# Patient Record
Sex: MALE | Race: WHITE | NOT HISPANIC OR LATINO | URBAN - METROPOLITAN AREA
[De-identification: names, ages, dates, MRNs, and addresses within clinical notes are randomized per-mention and may not be internally consistent; named-entity substitution may affect disease eponyms.]

---

## 2018-10-11 NOTE — ASU PATIENT PROFILE, ADULT - PMH
Coronary artery disease    Lung cancer  RIGHT LUNG, UPPER LOBECTOMY 2015  Myocardial infarction    [place]

## 2018-10-12 ENCOUNTER — OUTPATIENT (OUTPATIENT)
Dept: OUTPATIENT SERVICES | Facility: HOSPITAL | Age: 68
LOS: 1 days | Discharge: HOME | End: 2018-10-12

## 2018-10-12 VITALS
HEIGHT: 68 IN | OXYGEN SATURATION: 92 % | RESPIRATION RATE: 17 BRPM | WEIGHT: 160.94 LBS | DIASTOLIC BLOOD PRESSURE: 57 MMHG | SYSTOLIC BLOOD PRESSURE: 117 MMHG | TEMPERATURE: 96 F | HEART RATE: 75 BPM

## 2018-10-12 VITALS — HEART RATE: 72 BPM | DIASTOLIC BLOOD PRESSURE: 60 MMHG | SYSTOLIC BLOOD PRESSURE: 120 MMHG

## 2018-10-12 DIAGNOSIS — Z98.890 OTHER SPECIFIED POSTPROCEDURAL STATES: Chronic | ICD-10-CM

## 2018-10-12 DIAGNOSIS — Z90.2 ACQUIRED ABSENCE OF LUNG [PART OF]: Chronic | ICD-10-CM

## 2018-10-12 DIAGNOSIS — Z90.49 ACQUIRED ABSENCE OF OTHER SPECIFIED PARTS OF DIGESTIVE TRACT: Chronic | ICD-10-CM

## 2018-10-12 RX ORDER — SODIUM CHLORIDE 9 MG/ML
1000 INJECTION, SOLUTION INTRAVENOUS
Qty: 0 | Refills: 0 | Status: DISCONTINUED | OUTPATIENT
Start: 2018-10-12 | End: 2018-10-27

## 2018-10-12 RX ORDER — ONDANSETRON 8 MG/1
4 TABLET, FILM COATED ORAL ONCE
Qty: 0 | Refills: 0 | Status: DISCONTINUED | OUTPATIENT
Start: 2018-10-12 | End: 2018-10-27

## 2018-10-12 NOTE — PRE-ANESTHESIA EVALUATION ADULT - NSANTHOSAYNRD_GEN_A_CORE
No. GISSEL screening performed.  STOP BANG Legend: 0-2 = LOW Risk; 3-4 = INTERMEDIATE Risk; 5-8 = HIGH Risk

## 2018-10-17 DIAGNOSIS — H25.89 OTHER AGE-RELATED CATARACT: ICD-10-CM

## 2018-10-17 DIAGNOSIS — A80.9 ACUTE POLIOMYELITIS, UNSPECIFIED: ICD-10-CM

## 2018-10-17 DIAGNOSIS — H52.201 UNSPECIFIED ASTIGMATISM, RIGHT EYE: ICD-10-CM

## 2018-10-18 NOTE — ASU PATIENT PROFILE, ADULT - PSH
Cataract, right eye  with IOL placement, Oct. 2018  H/O colonoscopy  2013  H/O left knee surgery  TO CORRECT POLIO  History of lobectomy of lung  UPPER, RIGHT  History of tonsillectomy and adenoidectomy  CHILDHOOD  S/P appendectomy  CHILDHOOD

## 2018-10-18 NOTE — ASU PATIENT PROFILE, ADULT - PMH
Brain concussion  October, 2017  Chronic GERD    Coronary artery disease    Lung cancer  RIGHT LUNG, UPPER LOBECTOMY 2015  Myocardial infarction    Polio

## 2018-10-19 ENCOUNTER — OUTPATIENT (OUTPATIENT)
Dept: OUTPATIENT SERVICES | Facility: HOSPITAL | Age: 68
LOS: 1 days | Discharge: HOME | End: 2018-10-19

## 2018-10-19 VITALS
HEART RATE: 70 BPM | TEMPERATURE: 96 F | RESPIRATION RATE: 16 BRPM | WEIGHT: 160.06 LBS | SYSTOLIC BLOOD PRESSURE: 119 MMHG | DIASTOLIC BLOOD PRESSURE: 70 MMHG | HEIGHT: 68 IN

## 2018-10-19 VITALS — RESPIRATION RATE: 17 BRPM | SYSTOLIC BLOOD PRESSURE: 119 MMHG | DIASTOLIC BLOOD PRESSURE: 62 MMHG | HEART RATE: 61 BPM

## 2018-10-19 DIAGNOSIS — Z98.890 OTHER SPECIFIED POSTPROCEDURAL STATES: Chronic | ICD-10-CM

## 2018-10-19 DIAGNOSIS — H26.9 UNSPECIFIED CATARACT: Chronic | ICD-10-CM

## 2018-10-19 DIAGNOSIS — Z90.2 ACQUIRED ABSENCE OF LUNG [PART OF]: Chronic | ICD-10-CM

## 2018-10-19 DIAGNOSIS — Z90.49 ACQUIRED ABSENCE OF OTHER SPECIFIED PARTS OF DIGESTIVE TRACT: Chronic | ICD-10-CM

## 2018-10-19 RX ORDER — METOPROLOL TARTRATE 50 MG
1 TABLET ORAL
Qty: 0 | Refills: 0 | COMMUNITY

## 2018-10-19 RX ORDER — ONDANSETRON 8 MG/1
4 TABLET, FILM COATED ORAL ONCE
Qty: 0 | Refills: 0 | Status: DISCONTINUED | OUTPATIENT
Start: 2018-10-19 | End: 2018-11-03

## 2018-10-19 RX ORDER — SODIUM CHLORIDE 9 MG/ML
1000 INJECTION, SOLUTION INTRAVENOUS
Qty: 0 | Refills: 0 | Status: DISCONTINUED | OUTPATIENT
Start: 2018-10-19 | End: 2018-11-03

## 2018-10-19 RX ORDER — ACETAMINOPHEN 500 MG
650 TABLET ORAL ONCE
Qty: 0 | Refills: 0 | Status: DISCONTINUED | OUTPATIENT
Start: 2018-10-19 | End: 2018-11-03

## 2018-10-24 DIAGNOSIS — H25.89 OTHER AGE-RELATED CATARACT: ICD-10-CM

## 2019-02-02 ENCOUNTER — INPATIENT (INPATIENT)
Facility: HOSPITAL | Age: 69
LOS: 2 days | Discharge: HOME | End: 2019-02-05
Attending: HOSPITALIST | Admitting: HOSPITALIST

## 2019-02-02 VITALS
HEIGHT: 69 IN | TEMPERATURE: 98 F | SYSTOLIC BLOOD PRESSURE: 145 MMHG | WEIGHT: 151.02 LBS | RESPIRATION RATE: 18 BRPM | HEART RATE: 121 BPM | OXYGEN SATURATION: 99 % | DIASTOLIC BLOOD PRESSURE: 81 MMHG

## 2019-02-02 DIAGNOSIS — Z90.2 ACQUIRED ABSENCE OF LUNG [PART OF]: Chronic | ICD-10-CM

## 2019-02-02 DIAGNOSIS — Z90.49 ACQUIRED ABSENCE OF OTHER SPECIFIED PARTS OF DIGESTIVE TRACT: Chronic | ICD-10-CM

## 2019-02-02 DIAGNOSIS — Z95.5 PRESENCE OF CORONARY ANGIOPLASTY IMPLANT AND GRAFT: Chronic | ICD-10-CM

## 2019-02-02 DIAGNOSIS — H26.9 UNSPECIFIED CATARACT: Chronic | ICD-10-CM

## 2019-02-02 DIAGNOSIS — Z98.890 OTHER SPECIFIED POSTPROCEDURAL STATES: Chronic | ICD-10-CM

## 2019-02-02 PROBLEM — C34.90 MALIGNANT NEOPLASM OF UNSPECIFIED PART OF UNSPECIFIED BRONCHUS OR LUNG: Chronic | Status: ACTIVE | Noted: 2018-10-12

## 2019-02-02 PROBLEM — A80.9 ACUTE POLIOMYELITIS, UNSPECIFIED: Chronic | Status: ACTIVE | Noted: 2018-10-12

## 2019-02-02 PROBLEM — I25.10 ATHEROSCLEROTIC HEART DISEASE OF NATIVE CORONARY ARTERY WITHOUT ANGINA PECTORIS: Chronic | Status: ACTIVE | Noted: 2018-10-12

## 2019-02-02 PROBLEM — K21.9 GASTRO-ESOPHAGEAL REFLUX DISEASE WITHOUT ESOPHAGITIS: Chronic | Status: ACTIVE | Noted: 2018-10-19

## 2019-02-02 PROBLEM — S06.0X9A CONCUSSION WITH LOSS OF CONSCIOUSNESS OF UNSPECIFIED DURATION, INITIAL ENCOUNTER: Chronic | Status: ACTIVE | Noted: 2018-10-19

## 2019-02-02 LAB
ALBUMIN SERPL ELPH-MCNC: 4 G/DL — SIGNIFICANT CHANGE UP (ref 3.5–5.2)
ALP SERPL-CCNC: 178 U/L — HIGH (ref 30–115)
ALT FLD-CCNC: 11 U/L — SIGNIFICANT CHANGE UP (ref 0–41)
ANION GAP SERPL CALC-SCNC: 15 MMOL/L — HIGH (ref 7–14)
AST SERPL-CCNC: 12 U/L — SIGNIFICANT CHANGE UP (ref 0–41)
BASOPHILS # BLD AUTO: 0.07 K/UL — SIGNIFICANT CHANGE UP (ref 0–0.2)
BASOPHILS NFR BLD AUTO: 0.4 % — SIGNIFICANT CHANGE UP (ref 0–1)
BILIRUB SERPL-MCNC: 0.5 MG/DL — SIGNIFICANT CHANGE UP (ref 0.2–1.2)
BUN SERPL-MCNC: 28 MG/DL — HIGH (ref 10–20)
CALCIUM SERPL-MCNC: 9.3 MG/DL — SIGNIFICANT CHANGE UP (ref 8.5–10.1)
CHLORIDE SERPL-SCNC: 99 MMOL/L — SIGNIFICANT CHANGE UP (ref 98–110)
CO2 SERPL-SCNC: 28 MMOL/L — SIGNIFICANT CHANGE UP (ref 17–32)
CREAT SERPL-MCNC: 0.8 MG/DL — SIGNIFICANT CHANGE UP (ref 0.7–1.5)
EOSINOPHIL # BLD AUTO: 0.05 K/UL — SIGNIFICANT CHANGE UP (ref 0–0.7)
EOSINOPHIL NFR BLD AUTO: 0.3 % — SIGNIFICANT CHANGE UP (ref 0–8)
GLUCOSE SERPL-MCNC: 120 MG/DL — HIGH (ref 70–99)
HCT VFR BLD CALC: 49.7 % — SIGNIFICANT CHANGE UP (ref 42–52)
HGB BLD-MCNC: 15.9 G/DL — SIGNIFICANT CHANGE UP (ref 14–18)
IMM GRANULOCYTES NFR BLD AUTO: 0.4 % — HIGH (ref 0.1–0.3)
LYMPHOCYTES # BLD AUTO: 1.4 K/UL — SIGNIFICANT CHANGE UP (ref 1.2–3.4)
LYMPHOCYTES # BLD AUTO: 8.6 % — LOW (ref 20.5–51.1)
MCHC RBC-ENTMCNC: 30.2 PG — SIGNIFICANT CHANGE UP (ref 27–31)
MCHC RBC-ENTMCNC: 32 G/DL — SIGNIFICANT CHANGE UP (ref 32–37)
MCV RBC AUTO: 94.3 FL — HIGH (ref 80–94)
MONOCYTES # BLD AUTO: 0.92 K/UL — HIGH (ref 0.1–0.6)
MONOCYTES NFR BLD AUTO: 5.6 % — SIGNIFICANT CHANGE UP (ref 1.7–9.3)
NEUTROPHILS # BLD AUTO: 13.8 K/UL — HIGH (ref 1.4–6.5)
NEUTROPHILS NFR BLD AUTO: 84.7 % — HIGH (ref 42.2–75.2)
NRBC # BLD: 0 /100 WBCS — SIGNIFICANT CHANGE UP (ref 0–0)
PLATELET # BLD AUTO: 416 K/UL — HIGH (ref 130–400)
POTASSIUM SERPL-MCNC: 4.4 MMOL/L — SIGNIFICANT CHANGE UP (ref 3.5–5)
POTASSIUM SERPL-SCNC: 4.4 MMOL/L — SIGNIFICANT CHANGE UP (ref 3.5–5)
PROT SERPL-MCNC: 7.4 G/DL — SIGNIFICANT CHANGE UP (ref 6–8)
RBC # BLD: 5.27 M/UL — SIGNIFICANT CHANGE UP (ref 4.7–6.1)
RBC # FLD: 13.7 % — SIGNIFICANT CHANGE UP (ref 11.5–14.5)
SODIUM SERPL-SCNC: 142 MMOL/L — SIGNIFICANT CHANGE UP (ref 135–146)
TROPONIN T SERPL-MCNC: <0.01 NG/ML — SIGNIFICANT CHANGE UP
WBC # BLD: 16.31 K/UL — HIGH (ref 4.8–10.8)
WBC # FLD AUTO: 16.31 K/UL — HIGH (ref 4.8–10.8)

## 2019-02-02 RX ORDER — IPRATROPIUM/ALBUTEROL SULFATE 18-103MCG
3 AEROSOL WITH ADAPTER (GRAM) INHALATION ONCE
Qty: 0 | Refills: 0 | Status: COMPLETED | OUTPATIENT
Start: 2019-02-02 | End: 2019-02-02

## 2019-02-02 RX ORDER — VANCOMYCIN HCL 1 G
1500 VIAL (EA) INTRAVENOUS ONCE
Qty: 0 | Refills: 0 | Status: COMPLETED | OUTPATIENT
Start: 2019-02-02 | End: 2019-02-02

## 2019-02-02 RX ORDER — CEFEPIME 1 G/1
2000 INJECTION, POWDER, FOR SOLUTION INTRAMUSCULAR; INTRAVENOUS ONCE
Qty: 0 | Refills: 0 | Status: COMPLETED | OUTPATIENT
Start: 2019-02-02 | End: 2019-02-02

## 2019-02-02 RX ADMIN — CEFEPIME 100 MILLIGRAM(S): 1 INJECTION, POWDER, FOR SOLUTION INTRAMUSCULAR; INTRAVENOUS at 20:59

## 2019-02-02 RX ADMIN — CEFEPIME 2000 MILLIGRAM(S): 1 INJECTION, POWDER, FOR SOLUTION INTRAMUSCULAR; INTRAVENOUS at 22:53

## 2019-02-02 RX ADMIN — Medication 300 MILLIGRAM(S): at 22:53

## 2019-02-02 RX ADMIN — Medication 3 MILLILITER(S): at 15:55

## 2019-02-02 RX ADMIN — Medication 125 MILLIGRAM(S): at 17:37

## 2019-02-02 NOTE — ED PROVIDER NOTE - FAMILY HISTORY
Grandparent  Still living? No  Family history of MI (myocardial infarction), Age at diagnosis: Age Unknown

## 2019-02-02 NOTE — ED PROVIDER NOTE - PHYSICAL EXAMINATION
GENERAL: Well-nourished, Well-developed. NAD. Patient is sitting comfortably in chair. No resp distress. No difficulty speaking.  Eyes: PERRLA, EOMI. No asymmetry. No nystagmus. No conjunctival injection. Non-icteric sclera.  ENMT: MMM. No pharyngeal erythema or exudates. Uvula midline.   Neck: Supple. No LAD. No cervical midline TTP. FROM  CVS: No reproducible chest wall tenderness. Normal S1,S2. No murmurs appreciated on auscultation   RESP: + wheeze auscultated to lung fields B/L. No use of accessory muscles. Chest rise symmetrical with good expansion.   GI: Normal auscultation of bowel sounds in all 4 quadrants. Soft, Nontender, Nondistended.   MSK: FROM of upper and lower extremities B/L  Skin: Warm, Dry. No rashes or lesions   EXT: Radial pulses present B/L.   Neuro: AA&O x 3. CNs II-XII grossly intact. Speaking in full sentences. Sensation grossly intact. Strength 5/5 B/L. Gait within normal limits.   Psych: Appropriate mood and affect. Cooperative.

## 2019-02-02 NOTE — ED PROVIDER NOTE - MEDICAL DECISION MAKING DETAILS
Patient presented with generalized weakness, dyspnea and cough. Work up in ED ruled out PE via CTA chest but confirmed PNA. Patient otherwise found to also have mild leukocytosis. EKG non-ischemic and troponin negative. Patient not fluid overloaded on exam to suggest CHF. Otherwise HD stable, started on IV abx in ED. Will admit for further management and monitoring. Patient agreeable with plan.

## 2019-02-02 NOTE — ED PROVIDER NOTE - ATTENDING CONTRIBUTION TO CARE
69 year old male, pmhx COPD, Lung CA in remission s/p right upper lobectomy, GERD, CAD with stent, MIx2, presenting with dyspnea and cough x 1 week productive of yellow sputum. States he has also felt extremely weak over the past few weeks that has worsened acutely today. Patient states he normally does not use O2 at home but states he has been tested in terms of his SaO2 by his pulmonologists in [place] showing decreased O2 than normal, running in the low 90s. Also admits to decreased PO intake as well. Otherwise denies fevers, headache, vision changes, weakness/numbness, confusion, URI symptoms, neck pain, chest pain, back pain, palpitations, nausea, vomiting, abdominal pain, diarrhea, constipation, blood in stool/dark stools, urinary symptoms, penile discharge/testicular pain, leg swelling, rash, recent travel or sick contacts.    Vital Signs: I have reviewed the initial vital signs.  Constitutional: NAD, well-nourished, appears stated age, no acute distress.  HEENT: Airway patent, moist MM, no erythema/swelling/deformity of oral structures. EOMI, PERRLA.  CV: regular rate, regular rhythm, well-perfused extremities, 2+ b/l DP and radial pulses equal.  Lungs: BCTA, no increased WOB.  ABD: NTND, no guarding or rebound, no pulsatile mass, no hernias.   MSK: Neck supple, nontender, nl ROM, no stepoff. Chest nontender. Back nontender in TLS spine or to b/l bony structures or flanks. Ext nontender, nl rom, no deformity.   INTEG: Skin warm, dry, no rash.  NEURO: A&Ox3, normal strength, nl sensation throughout, normal speech.   PSYCH: Calm, cooperative, normal affect and interaction.    Will get labs, consider CTA chest to rule out PE as well as check for PNA given high risk patient with lung CA. Patient tachycardic on arrival. Will place O2 NC PRN, re-eval. No signs of fluid overload on exam.

## 2019-02-02 NOTE — ED PROVIDER NOTE - PSH
Cataract, right eye  with IOL placement, Oct. 2018  H/O colonoscopy  2013  H/O heart artery stent    H/O left knee surgery  TO CORRECT POLIO  History of lobectomy of lung  UPPER, RIGHT  History of tonsillectomy and adenoidectomy  CHILDHOOD  S/P appendectomy  CHILDHOOD Cataract, right eye  with IOL placement, Oct. 2018  H/O colonoscopy  2013  H/O heart artery stent    H/O left knee surgery  TO CORRECT POLIO  History of coronary artery stent placement    History of lobectomy of lung  UPPER, RIGHT  History of tonsillectomy and adenoidectomy  CHILDHOOD  S/P appendectomy  CHILDHOOD

## 2019-02-02 NOTE — ED PROVIDER NOTE - PMH
Brain concussion  October, 2017  Chronic GERD    Coronary artery disease    Lung cancer  RIGHT LUNG, UPPER LOBECTOMY 2015  Myocardial infarction    Polio Brain concussion  October, 2017  Chronic GERD    Coronary artery disease    Lung cancer  RIGHT LUNG, UPPER LOBECTOMY 2015  Myocardial infarction  x 2  Polio

## 2019-02-02 NOTE — ED ADULT NURSE NOTE - PSH
Cataract, right eye  with IOL placement, Oct. 2018  H/O colonoscopy  2013  H/O heart artery stent    H/O left knee surgery  TO CORRECT POLIO  History of lobectomy of lung  UPPER, RIGHT  History of tonsillectomy and adenoidectomy  CHILDHOOD  S/P appendectomy  CHILDHOOD

## 2019-02-02 NOTE — ED ADULT NURSE NOTE - NSIMPLEMENTINTERV_GEN_ALL_ED
Implemented All Universal Safety Interventions:  [place] to call system. Call bell, personal items and telephone within reach. Instruct patient to call for assistance. Room bathroom lighting operational. Non-slip footwear when patient is off stretcher. Physically safe environment: no spills, clutter or unnecessary equipment. Stretcher in lowest position, wheels locked, appropriate side rails in place.

## 2019-02-02 NOTE — ED PROVIDER NOTE - OBJECTIVE STATEMENT
68 yo male with PMH of COPD, Lung cancer (remission, Right upper lobectomy, radiation last year), GERD, CAD, 1 cardiac stent, MI x 2, Polio presents to the ED c/o SOB and productive cough x 1 week. 70 yo male with PMH of COPD, Lung cancer (remission, Right upper lobectomy, radiation last year), GERD, CAD, 1 cardiac stent, MI x 2, Polio presents to the ED c/o SOB and productive cough x 1 week. Patient states over the past two weeks he has felt feverish, dizzy, bodyaches, and has been laying in bed feeling ill.  Patient states he started to feel better, but daughter insisted patient come to the ED. Patient does not use O2 at home for diagnosis of COPD and states he normally is been 94-95% on RA. Patient checks his pulse ox at home frequently and noticed that its been around 90% lately.  Patient states Nyquil and ASA has helped, last taken yesterday. Patient denies chest pain, difficulty breathing, N/V/D, sore throat, ear pain, headache, or dizziness.

## 2019-02-02 NOTE — ED PROVIDER NOTE - NS ED ROS FT
Constitutional: (+) fever (+) chills (+) bodyaches (-) malaise (-) diaphoresis   Eyes: (-) visual changes (-) photophobia  ENMT: (-) ear pain (-) ear discharge or infections (-) neck pain (-) neck stiffness (-) sore throat (-) nasal or chest congestion (-) runny nose  Cardiac: (-) chest pain  (-) palpitations (-) syncope   Respiratory: (+) productive cough with yellow sputum (+) SOB (-) hemoptysis   GI: (-) nausea (-) vomiting (-) diarrhea (-) abdominal pain  : (-) dysuria   MS: (-) muscle weakness (-) back pain.  Neuro: (-) headache (-) dizziness (-) numbness/tingling to extremities B/L (-) weakness (-) AMS  Skin: (-) rash (-) laceration  Psychiatric: (-) hallucinations (-) intoxication  Except as documented in the HPI, all other systems are negative.

## 2019-02-03 PROBLEM — I21.9 ACUTE MYOCARDIAL INFARCTION, UNSPECIFIED: Chronic | Status: ACTIVE | Noted: 2018-10-12

## 2019-02-03 LAB
ALBUMIN SERPL ELPH-MCNC: 3.5 G/DL — SIGNIFICANT CHANGE UP (ref 3.5–5.2)
ALP SERPL-CCNC: 148 U/L — HIGH (ref 30–115)
ALT FLD-CCNC: 8 U/L — SIGNIFICANT CHANGE UP (ref 0–41)
ANION GAP SERPL CALC-SCNC: 10 MMOL/L — SIGNIFICANT CHANGE UP (ref 7–14)
APPEARANCE UR: CLEAR — SIGNIFICANT CHANGE UP
AST SERPL-CCNC: 9 U/L — SIGNIFICANT CHANGE UP (ref 0–41)
BASOPHILS # BLD AUTO: 0.01 K/UL — SIGNIFICANT CHANGE UP (ref 0–0.2)
BASOPHILS NFR BLD AUTO: 0.1 % — SIGNIFICANT CHANGE UP (ref 0–1)
BILIRUB SERPL-MCNC: 0.2 MG/DL — SIGNIFICANT CHANGE UP (ref 0.2–1.2)
BILIRUB UR-MCNC: NEGATIVE — SIGNIFICANT CHANGE UP
BUN SERPL-MCNC: 24 MG/DL — HIGH (ref 10–20)
CALCIUM SERPL-MCNC: 8.6 MG/DL — SIGNIFICANT CHANGE UP (ref 8.5–10.1)
CHLORIDE SERPL-SCNC: 99 MMOL/L — SIGNIFICANT CHANGE UP (ref 98–110)
CO2 SERPL-SCNC: 27 MMOL/L — SIGNIFICANT CHANGE UP (ref 17–32)
COLOR SPEC: YELLOW — SIGNIFICANT CHANGE UP
CREAT SERPL-MCNC: 0.8 MG/DL — SIGNIFICANT CHANGE UP (ref 0.7–1.5)
DIFF PNL FLD: NEGATIVE — SIGNIFICANT CHANGE UP
EOSINOPHIL # BLD AUTO: 0.01 K/UL — SIGNIFICANT CHANGE UP (ref 0–0.7)
EOSINOPHIL NFR BLD AUTO: 0.1 % — SIGNIFICANT CHANGE UP (ref 0–8)
GLUCOSE SERPL-MCNC: 128 MG/DL — HIGH (ref 70–99)
GLUCOSE UR QL: 500 MG/DL
HCT VFR BLD CALC: 43.1 % — SIGNIFICANT CHANGE UP (ref 42–52)
HCV AB S/CO SERPL IA: 0.16 S/CO — SIGNIFICANT CHANGE UP
HCV AB SERPL-IMP: SIGNIFICANT CHANGE UP
HGB BLD-MCNC: 14 G/DL — SIGNIFICANT CHANGE UP (ref 14–18)
IMM GRANULOCYTES NFR BLD AUTO: 0.6 % — HIGH (ref 0.1–0.3)
KETONES UR-MCNC: ABNORMAL
LEUKOCYTE ESTERASE UR-ACNC: NEGATIVE — SIGNIFICANT CHANGE UP
LYMPHOCYTES # BLD AUTO: 1.01 K/UL — LOW (ref 1.2–3.4)
LYMPHOCYTES # BLD AUTO: 8.6 % — LOW (ref 20.5–51.1)
MCHC RBC-ENTMCNC: 30 PG — SIGNIFICANT CHANGE UP (ref 27–31)
MCHC RBC-ENTMCNC: 32.5 G/DL — SIGNIFICANT CHANGE UP (ref 32–37)
MCV RBC AUTO: 92.3 FL — SIGNIFICANT CHANGE UP (ref 80–94)
MONOCYTES # BLD AUTO: 0.19 K/UL — SIGNIFICANT CHANGE UP (ref 0.1–0.6)
MONOCYTES NFR BLD AUTO: 1.6 % — LOW (ref 1.7–9.3)
NEUTROPHILS # BLD AUTO: 10.49 K/UL — HIGH (ref 1.4–6.5)
NEUTROPHILS NFR BLD AUTO: 89 % — HIGH (ref 42.2–75.2)
NITRITE UR-MCNC: NEGATIVE — SIGNIFICANT CHANGE UP
NRBC # BLD: 0 /100 WBCS — SIGNIFICANT CHANGE UP (ref 0–0)
PH UR: 6 — SIGNIFICANT CHANGE UP (ref 5–8)
PLATELET # BLD AUTO: 379 K/UL — SIGNIFICANT CHANGE UP (ref 130–400)
POTASSIUM SERPL-MCNC: 4.9 MMOL/L — SIGNIFICANT CHANGE UP (ref 3.5–5)
POTASSIUM SERPL-SCNC: 4.9 MMOL/L — SIGNIFICANT CHANGE UP (ref 3.5–5)
PROT SERPL-MCNC: 6.4 G/DL — SIGNIFICANT CHANGE UP (ref 6–8)
PROT UR-MCNC: 30 MG/DL
RBC # BLD: 4.67 M/UL — LOW (ref 4.7–6.1)
RBC # FLD: 13.5 % — SIGNIFICANT CHANGE UP (ref 11.5–14.5)
SODIUM SERPL-SCNC: 136 MMOL/L — SIGNIFICANT CHANGE UP (ref 135–146)
SP GR SPEC: 1.02 — SIGNIFICANT CHANGE UP (ref 1.01–1.03)
UROBILINOGEN FLD QL: 0.2 MG/DL — SIGNIFICANT CHANGE UP (ref 0.2–0.2)
WBC # BLD: 11.78 K/UL — HIGH (ref 4.8–10.8)
WBC # FLD AUTO: 11.78 K/UL — HIGH (ref 4.8–10.8)
WBC UR QL: SIGNIFICANT CHANGE UP /HPF

## 2019-02-03 RX ORDER — METOPROLOL TARTRATE 50 MG
50 TABLET ORAL DAILY
Qty: 0 | Refills: 0 | Status: DISCONTINUED | OUTPATIENT
Start: 2019-02-03 | End: 2019-02-05

## 2019-02-03 RX ORDER — CEFTRIAXONE 500 MG/1
1 INJECTION, POWDER, FOR SOLUTION INTRAMUSCULAR; INTRAVENOUS EVERY 24 HOURS
Qty: 0 | Refills: 0 | Status: DISCONTINUED | OUTPATIENT
Start: 2019-02-03 | End: 2019-02-05

## 2019-02-03 RX ORDER — TRAZODONE HCL 50 MG
1 TABLET ORAL
Qty: 0 | Refills: 0 | COMMUNITY

## 2019-02-03 RX ORDER — AZITHROMYCIN 500 MG/1
500 TABLET, FILM COATED ORAL EVERY 24 HOURS
Qty: 0 | Refills: 0 | Status: DISCONTINUED | OUTPATIENT
Start: 2019-02-03 | End: 2019-02-05

## 2019-02-03 RX ORDER — FAMOTIDINE 10 MG/ML
20 INJECTION INTRAVENOUS ONCE
Qty: 0 | Refills: 0 | Status: COMPLETED | OUTPATIENT
Start: 2019-02-03 | End: 2019-02-03

## 2019-02-03 RX ORDER — AMLODIPINE BESYLATE AND BENAZEPRIL HYDROCHLORIDE 10; 20 MG/1; MG/1
1 CAPSULE ORAL
Qty: 0 | Refills: 0 | COMMUNITY

## 2019-02-03 RX ORDER — TRAZODONE HCL 50 MG
50 TABLET ORAL AT BEDTIME
Qty: 0 | Refills: 0 | Status: DISCONTINUED | OUTPATIENT
Start: 2019-02-03 | End: 2019-02-05

## 2019-02-03 RX ORDER — ASPIRIN/CALCIUM CARB/MAGNESIUM 324 MG
81 TABLET ORAL DAILY
Qty: 0 | Refills: 0 | Status: DISCONTINUED | OUTPATIENT
Start: 2019-02-03 | End: 2019-02-05

## 2019-02-03 RX ORDER — BUDESONIDE AND FORMOTEROL FUMARATE DIHYDRATE 160; 4.5 UG/1; UG/1
2 AEROSOL RESPIRATORY (INHALATION)
Qty: 0 | Refills: 0 | Status: DISCONTINUED | OUTPATIENT
Start: 2019-02-03 | End: 2019-02-05

## 2019-02-03 RX ORDER — ENOXAPARIN SODIUM 100 MG/ML
40 INJECTION SUBCUTANEOUS EVERY 24 HOURS
Qty: 0 | Refills: 0 | Status: DISCONTINUED | OUTPATIENT
Start: 2019-02-03 | End: 2019-02-05

## 2019-02-03 RX ORDER — METOPROLOL TARTRATE 50 MG
0 TABLET ORAL
Qty: 0 | Refills: 0 | COMMUNITY

## 2019-02-03 RX ORDER — FAMOTIDINE 10 MG/ML
40 INJECTION INTRAVENOUS DAILY
Qty: 0 | Refills: 0 | Status: DISCONTINUED | OUTPATIENT
Start: 2019-02-03 | End: 2019-02-05

## 2019-02-03 RX ORDER — BACLOFEN 100 %
10 POWDER (GRAM) MISCELLANEOUS DAILY
Qty: 0 | Refills: 0 | Status: DISCONTINUED | OUTPATIENT
Start: 2019-02-03 | End: 2019-02-05

## 2019-02-03 RX ADMIN — CEFTRIAXONE 100 GRAM(S): 500 INJECTION, POWDER, FOR SOLUTION INTRAMUSCULAR; INTRAVENOUS at 13:13

## 2019-02-03 RX ADMIN — ENOXAPARIN SODIUM 40 MILLIGRAM(S): 100 INJECTION SUBCUTANEOUS at 13:14

## 2019-02-03 RX ADMIN — BUDESONIDE AND FORMOTEROL FUMARATE DIHYDRATE 2 PUFF(S): 160; 4.5 AEROSOL RESPIRATORY (INHALATION) at 20:23

## 2019-02-03 RX ADMIN — Medication 50 MILLIGRAM(S): at 23:03

## 2019-02-03 RX ADMIN — AZITHROMYCIN 255 MILLIGRAM(S): 500 TABLET, FILM COATED ORAL at 14:25

## 2019-02-03 RX ADMIN — FAMOTIDINE 40 MILLIGRAM(S): 10 INJECTION INTRAVENOUS at 05:14

## 2019-02-03 RX ADMIN — Medication 81 MILLIGRAM(S): at 13:14

## 2019-02-03 RX ADMIN — BUDESONIDE AND FORMOTEROL FUMARATE DIHYDRATE 2 PUFF(S): 160; 4.5 AEROSOL RESPIRATORY (INHALATION) at 13:13

## 2019-02-03 NOTE — H&P ADULT - ASSESSMENT
70 yo M with copd, lung cancer s/p resection, MI s/p stents presents for evaluation of 3 weeks of intermittent fevers, sob, malaise    Community acquire pneumonia  -c/w rocephin, azithromycin  -tylenol for fevers  -solumedrol, nebs  -no evidence of sepsis on admission    HTN  -c/w amlodipine    COPD  -c/w symbicort    History of Polio  -baclofen for cramps    DVT px  Full Code  From Home  OOB as tolerated

## 2019-02-03 NOTE — H&P ADULT - NSHPPHYSICALEXAM_GEN_ALL_CORE
ICU Vital Signs Last 24 Hrs  T(C): 37.7 (02 Feb 2019 22:30), Max: 37.9 (02 Feb 2019 16:52)  T(F): 99.8 (02 Feb 2019 22:30), Max: 100.2 (02 Feb 2019 16:52)  HR: 89 (02 Feb 2019 22:30) (89 - 121)  BP: 126/71 (02 Feb 2019 22:30) (126/71 - 145/81)  RR: 18 (02 Feb 2019 22:30) (17 - 18)  SpO2: 94% (02 Feb 2019 22:30) (93% - 95%)    PHYSICAL EXAM:  GENERAL: NAD, speaks in full sentences, no signs of respiratory distress  HEAD: Anicteric  NECK: Supple, No JVD  CHEST/LUNG: Diffuse wheezing  HEART: Regular rate and rhythm; No murmurs;   ABDOMEN: Soft, Nontender, Nondistended; Bowel sounds present; No guarding  EXTREMITIES:  2+ Peripheral Pulses, No cyanosis or edema  PSYCH: AAOx3  NEUROLOGY: non-focal  SKIN: No rashes or lesions

## 2019-02-03 NOTE — H&P ADULT - PMH
Brain concussion  October, 2017  Chronic GERD    Coronary artery disease    Lung cancer  RIGHT LUNG, UPPER LOBECTOMY 2015  Myocardial infarction  x 2  Polio

## 2019-02-03 NOTE — H&P ADULT - NSHPLABSRESULTS_GEN_ALL_CORE
15.9   16.31 )-----------( 416      ( 02 Feb 2019 16:48 )             49.7       02-02    142  |  99  |  28<H>  ----------------------------<  120<H>  4.4   |  28  |  0.8    Ca    9.3      02 Feb 2019 16:48    TPro  7.4  /  Alb  4.0  /  TBili  0.5  /  DBili  x   /  AST  12  /  ALT  11  /  AlkPhos  178<H>  02-02        CARDIAC MARKERS ( 02 Feb 2019 16:48 )  x     / <0.01 ng/mL / x     / x     / x          < from: CT Angio Chest w/ IV Cont (02.02.19 @ 19:13) >    Patchy right lower lobe consolidations, consistent with early pneumonia.  No CTA evidence of acute pulmonary embolus.  Status post right upper lobectomy with fibrotic changes within the right   lung, consistent with provided clinical history of radiation therapy.    < end of copied text >    < from: Xray Chest 2 Views PA/Lat (02.02.19 @ 15:46) >      Questionable retrocardiac opacity.    Right lung post surgical changes.    < end of copied text >

## 2019-02-03 NOTE — H&P ADULT - PSH
Cataract, right eye  with IOL placement, Oct. 2018  H/O colonoscopy  2013  H/O heart artery stent    H/O left knee surgery  TO CORRECT POLIO  History of coronary artery stent placement    History of lobectomy of lung  UPPER, RIGHT  History of tonsillectomy and adenoidectomy  CHILDHOOD  S/P appendectomy  CHILDHOOD

## 2019-02-03 NOTE — H&P ADULT - HISTORY OF PRESENT ILLNESS
70 yo M with COPD (no home o2), polio, lung cancer s/p lobectomy 3 years ago with recurrence 1 years ago treated with radiation presents for 3 week history of malaise intermittent fevers, cough, SOB. Pt states he has spent most of time in bed because he felt so tired. His daughter has been urging him to seek medical treatment over these 3 weeks and he decided to do so today. He has a pulse ox at home and his Spo2 has been 87% for past few days when normally it is 93%. He has not tried anything to help his symptoms. He initially had rhinorrhea and myalgias 3 weeks ago but have since resolved. Unaware of any exacerbating of alleviating factors. Follows with numerous subspecialist all of which are located in [place].

## 2019-02-04 LAB
ANION GAP SERPL CALC-SCNC: 8 MMOL/L — SIGNIFICANT CHANGE UP (ref 7–14)
BASOPHILS # BLD AUTO: 0.06 K/UL — SIGNIFICANT CHANGE UP (ref 0–0.2)
BASOPHILS NFR BLD AUTO: 0.4 % — SIGNIFICANT CHANGE UP (ref 0–1)
BUN SERPL-MCNC: 25 MG/DL — HIGH (ref 10–20)
CALCIUM SERPL-MCNC: 8.8 MG/DL — SIGNIFICANT CHANGE UP (ref 8.5–10.1)
CHLORIDE SERPL-SCNC: 103 MMOL/L — SIGNIFICANT CHANGE UP (ref 98–110)
CO2 SERPL-SCNC: 31 MMOL/L — SIGNIFICANT CHANGE UP (ref 17–32)
CREAT SERPL-MCNC: 0.7 MG/DL — SIGNIFICANT CHANGE UP (ref 0.7–1.5)
EOSINOPHIL # BLD AUTO: 0.04 K/UL — SIGNIFICANT CHANGE UP (ref 0–0.7)
EOSINOPHIL NFR BLD AUTO: 0.3 % — SIGNIFICANT CHANGE UP (ref 0–8)
GLUCOSE SERPL-MCNC: 102 MG/DL — HIGH (ref 70–99)
HCT VFR BLD CALC: 45.8 % — SIGNIFICANT CHANGE UP (ref 42–52)
HGB BLD-MCNC: 14.7 G/DL — SIGNIFICANT CHANGE UP (ref 14–18)
IMM GRANULOCYTES NFR BLD AUTO: 0.5 % — HIGH (ref 0.1–0.3)
LYMPHOCYTES # BLD AUTO: 18.2 % — LOW (ref 20.5–51.1)
LYMPHOCYTES # BLD AUTO: 2.79 K/UL — SIGNIFICANT CHANGE UP (ref 1.2–3.4)
MAGNESIUM SERPL-MCNC: 1.8 MG/DL — SIGNIFICANT CHANGE UP (ref 1.8–2.4)
MCHC RBC-ENTMCNC: 29.7 PG — SIGNIFICANT CHANGE UP (ref 27–31)
MCHC RBC-ENTMCNC: 32.1 G/DL — SIGNIFICANT CHANGE UP (ref 32–37)
MCV RBC AUTO: 92.5 FL — SIGNIFICANT CHANGE UP (ref 80–94)
MONOCYTES # BLD AUTO: 1.01 K/UL — HIGH (ref 0.1–0.6)
MONOCYTES NFR BLD AUTO: 6.6 % — SIGNIFICANT CHANGE UP (ref 1.7–9.3)
NEUTROPHILS # BLD AUTO: 11.38 K/UL — HIGH (ref 1.4–6.5)
NEUTROPHILS NFR BLD AUTO: 74 % — SIGNIFICANT CHANGE UP (ref 42.2–75.2)
NRBC # BLD: 0 /100 WBCS — SIGNIFICANT CHANGE UP (ref 0–0)
PLATELET # BLD AUTO: 425 K/UL — HIGH (ref 130–400)
POTASSIUM SERPL-MCNC: 4.4 MMOL/L — SIGNIFICANT CHANGE UP (ref 3.5–5)
POTASSIUM SERPL-SCNC: 4.4 MMOL/L — SIGNIFICANT CHANGE UP (ref 3.5–5)
RBC # BLD: 4.95 M/UL — SIGNIFICANT CHANGE UP (ref 4.7–6.1)
RBC # FLD: 13.7 % — SIGNIFICANT CHANGE UP (ref 11.5–14.5)
SODIUM SERPL-SCNC: 142 MMOL/L — SIGNIFICANT CHANGE UP (ref 135–146)
WBC # BLD: 15.35 K/UL — HIGH (ref 4.8–10.8)
WBC # FLD AUTO: 15.35 K/UL — HIGH (ref 4.8–10.8)

## 2019-02-04 RX ORDER — TIOTROPIUM BROMIDE 18 UG/1
1 CAPSULE ORAL; RESPIRATORY (INHALATION) DAILY
Qty: 0 | Refills: 0 | Status: DISCONTINUED | OUTPATIENT
Start: 2019-02-04 | End: 2019-02-05

## 2019-02-04 RX ORDER — PSEUDOEPHEDRINE HCL 30 MG
30 TABLET ORAL
Qty: 0 | Refills: 0 | Status: COMPLETED | OUTPATIENT
Start: 2019-02-04 | End: 2019-02-04

## 2019-02-04 RX ADMIN — CEFTRIAXONE 100 GRAM(S): 500 INJECTION, POWDER, FOR SOLUTION INTRAMUSCULAR; INTRAVENOUS at 13:07

## 2019-02-04 RX ADMIN — Medication 40 MILLIGRAM(S): at 13:08

## 2019-02-04 RX ADMIN — Medication 30 MILLIGRAM(S): at 06:46

## 2019-02-04 RX ADMIN — ENOXAPARIN SODIUM 40 MILLIGRAM(S): 100 INJECTION SUBCUTANEOUS at 13:08

## 2019-02-04 RX ADMIN — Medication 30 MILLIGRAM(S): at 17:19

## 2019-02-04 RX ADMIN — FAMOTIDINE 40 MILLIGRAM(S): 10 INJECTION INTRAVENOUS at 11:55

## 2019-02-04 RX ADMIN — BUDESONIDE AND FORMOTEROL FUMARATE DIHYDRATE 2 PUFF(S): 160; 4.5 AEROSOL RESPIRATORY (INHALATION) at 08:18

## 2019-02-04 RX ADMIN — AZITHROMYCIN 255 MILLIGRAM(S): 500 TABLET, FILM COATED ORAL at 15:06

## 2019-02-04 RX ADMIN — Medication 40 MILLIGRAM(S): at 21:21

## 2019-02-04 RX ADMIN — Medication 81 MILLIGRAM(S): at 11:55

## 2019-02-04 RX ADMIN — TIOTROPIUM BROMIDE 1 CAPSULE(S): 18 CAPSULE ORAL; RESPIRATORY (INHALATION) at 17:19

## 2019-02-04 RX ADMIN — Medication 50 MILLIGRAM(S): at 06:17

## 2019-02-04 RX ADMIN — Medication 50 MILLIGRAM(S): at 21:20

## 2019-02-04 NOTE — PROGRESS NOTE ADULT - SUBJECTIVE AND OBJECTIVE BOX
JOSELIN MURPHY  69y  Male      Patient is a 69y old  Male who presents with Pneumonia (2019 00:00)      INTERVAL HPI/OVERNIGHT EVENTS:  70 yo M with COPD (no home o2), polio, lung cancer s/p lobectomy 3 years ago with recurrence 1 years ago treated with radiation presents for 3 week history of malaise intermittent fevers, cough, SOB. Pt states he has spent most of time in bed because he felt so tired. His daughter has been urging him to seek medical treatment over these 3 weeks and he decided to do so today. He has a pulse ox at home and his Spo2 has been 87% for past few days when normally it is 93%. He has not tried anything to help his symptoms. He initially had rhinorrhea and myalgias 3 weeks ago but have since resolved. Unaware of any exacerbating of alleviating factors. Follows with numerous subspecialist all of which are located in [place].       REVIEW OF SYSTEMS:  CONSTITUTIONAL: fever, weight loss and fatigue  EYES: No eye pain, visual disturbances, or discharge  ENMT:  No difficulty hearing, tinnitus, vertigo; No sinus or throat pain  NECK: No pain or stiffness  RESPIRATORY: cough and shortness of breath  CARDIOVASCULAR: No chest pain, palpitations, dizziness, or leg swelling  GASTROINTESTINAL: No abdominal or epigastric pain. No nausea, vomiting, or hematemesis; No diarrhea or constipation. No melena or hematochezia.  GENITOURINARY: No dysuria, frequency, hematuria, or incontinence  NEUROLOGICAL: No headaches, memory loss, loss of strength, numbness, or tremors  SKIN: No itching, burning, rashes, or lesions   LYMPH NODES: No enlarged glands  ENDOCRINE: No heat or cold intolerance; No hair loss  MUSCULOSKELETAL: No joint pain or swelling; No muscle, back, or extremity pain  PSYCHIATRIC: No depression, anxiety, mood swings, or difficulty sleeping  HEME/LYMPH: No easy bruising, or bleeding gums  ALLERY AND IMMUNOLOGIC: No hives or eczema    Vital Signs Last 24 Hrs  T(C): 35.9 (2019 06:00), Max: 36.2 (2019 14:50)  T(F): 96.6 (2019 06:00), Max: 97.1 (2019 14:50)  HR: 71 (2019 06:00) (71 - 95)  BP: 122/60 (2019 06:00) (122/60 - 138/62)  BP(mean): --  RR: 16 (2019 06:00) (16 - 18)  SpO2: 94% (2019 14:22) (94% - 94%) on room air      PHYSICAL EXAM:  GENERAL: NAD, well-groomed, well-developed  HEAD:  Atraumatic, Normocephalic  EYES: EOMI, PERRLA, conjunctiva and sclera clear  ENMT: No tonsillar erythema, exudates, or enlargement; Moist mucous membranes, Good dentition, No lesions  NECK: Supple, No JVD, Normal thyroid  NERVOUS SYSTEM:  Alert & Oriented X3, Good concentration; Motor Strength 5/5 B/L upper and lower extremities; DTRs 2+ intact and symmetric  CHEST/LUNG: b/l expiratory wheezing  HEART: Regular rate and rhythm; No murmurs, rubs, or gallops  ABDOMEN: Soft, Nontender, Nondistended; Bowel sounds present  EXTREMITIES:  2+ Peripheral Pulses, No clubbing, cyanosis, or edema  LYMPH: No lymphadenopathy noted  SKIN: No rashes or lesions    Consultant(s) Notes Reviewed:  [x ] YES  [ ] NO  Care Discussed with Consultants/Other Providers [ x] YES  [ ] NO    LAB:      142  |  103  |  25<H>  ----------------------------<  102<H>  4.4   |  31  |  0.7    Ca    8.8      2019 08:19  Mg     1.8     -    TPro  6.4  /  Alb  3.5  /  TBili  0.2  /  DBili  x   /  AST  9   /  ALT  8   /  AlkPhos  148<H>  02-03    CARDIAC MARKERS ( 2019 16:48 )  x     / <0.01 ng/mL / x     / x     / x                              14.7   15.35 )-----------( 425      ( 2019 08:19 )             45.8     Daily     Daily   Drug Dosing Weight  Height (cm): 175.26 (2019 01:31)  Weight (kg): 70 (2019 01:31)  BMI (kg/m2): 22.8 (2019 01:31)  BSA (m2): 1.85 (2019 01:31)  CAPILLARY BLOOD GLUCOSE        I&O's Summary    Urinalysis Basic - ( 2019 14:19 )    Color: Yellow / Appearance: Clear / S.020 / pH: x  Gluc: x / Ketone: Trace  / Bili: Negative / Urobili: 0.2 mg/dL   Blood: x / Protein: 30 mg/dL / Nitrite: Negative   Leuk Esterase: Negative / RBC: x / WBC 3-5 /HPF   Sq Epi: x / Non Sq Epi: x / Bacteria: x      RADIOLOGY & ADDITIONAL TESTS:    Imaging Personally Reviewed:  [x] YES  [ ] NO    HEALTH ISSUES - PROBLEM Dx:      MEDS:  aspirin enteric coated 81 milliGRAM(s) Oral daily  azithromycin  IVPB 500 milliGRAM(s) IV Intermittent every 24 hours  baclofen 10 milliGRAM(s) Oral daily PRN  buDESOnide 160 MICROgram(s)/formoterol 4.5 MICROgram(s) Inhaler 2 Puff(s) Inhalation two times a day  cefTRIAXone   IVPB 1 Gram(s) IV Intermittent every 24 hours  enoxaparin Injectable 40 milliGRAM(s) SubCutaneous every 24 hours  famotidine    Tablet 40 milliGRAM(s) Oral daily  methylPREDNISolone sodium succinate Injectable 40 milliGRAM(s) IV Push every 12 hours  metoprolol succinate ER 50 milliGRAM(s) Oral daily  pseudoephedrine 30 milliGRAM(s) Oral two times a day  traZODone 50 milliGRAM(s) Oral at bedtime      Progress Note Handoff  Pending:  Consults  Tests  Results  Family Discussion:  Disposition: Home__x_/SNF____/Other______________/Unknown at this time_____

## 2019-02-04 NOTE — PROGRESS NOTE ADULT - ASSESSMENT
68 yo M with copd, lung cancer s/p resection, MI s/p stents presents for evaluation of 3 weeks of intermittent fevers, sob, malaise    Community acquired pneumonia  -c/w rocephin, azithromycin  -tylenol for fevers  -start solumedrol, continue nebs  -start spiriva  -no evidence of sepsis on admission    HTN  -c/w amlodipine    COPD  -c/w symbicort    History of Polio  -baclofen for cramps    DVT px  Full Code  From Home  OOB as tolerated  Anticipate d/c in am if improves

## 2019-02-05 ENCOUNTER — TRANSCRIPTION ENCOUNTER (OUTPATIENT)
Age: 69
End: 2019-02-05

## 2019-02-05 VITALS — OXYGEN SATURATION: 93 %

## 2019-02-05 LAB
ALBUMIN SERPL ELPH-MCNC: 3.7 G/DL — SIGNIFICANT CHANGE UP (ref 3.5–5.2)
ALP SERPL-CCNC: 139 U/L — HIGH (ref 30–115)
ALT FLD-CCNC: 19 U/L — SIGNIFICANT CHANGE UP (ref 0–41)
ANION GAP SERPL CALC-SCNC: 14 MMOL/L — SIGNIFICANT CHANGE UP (ref 7–14)
AST SERPL-CCNC: 16 U/L — SIGNIFICANT CHANGE UP (ref 0–41)
BILIRUB SERPL-MCNC: 0.3 MG/DL — SIGNIFICANT CHANGE UP (ref 0.2–1.2)
BUN SERPL-MCNC: 25 MG/DL — HIGH (ref 10–20)
CALCIUM SERPL-MCNC: 9.2 MG/DL — SIGNIFICANT CHANGE UP (ref 8.5–10.1)
CHLORIDE SERPL-SCNC: 98 MMOL/L — SIGNIFICANT CHANGE UP (ref 98–110)
CO2 SERPL-SCNC: 26 MMOL/L — SIGNIFICANT CHANGE UP (ref 17–32)
CREAT SERPL-MCNC: 0.7 MG/DL — SIGNIFICANT CHANGE UP (ref 0.7–1.5)
GLUCOSE SERPL-MCNC: 131 MG/DL — HIGH (ref 70–99)
HCT VFR BLD CALC: 47.6 % — SIGNIFICANT CHANGE UP (ref 42–52)
HGB BLD-MCNC: 15.4 G/DL — SIGNIFICANT CHANGE UP (ref 14–18)
MCHC RBC-ENTMCNC: 30 PG — SIGNIFICANT CHANGE UP (ref 27–31)
MCHC RBC-ENTMCNC: 32.4 G/DL — SIGNIFICANT CHANGE UP (ref 32–37)
MCV RBC AUTO: 92.8 FL — SIGNIFICANT CHANGE UP (ref 80–94)
NRBC # BLD: 0 /100 WBCS — SIGNIFICANT CHANGE UP (ref 0–0)
PLATELET # BLD AUTO: 459 K/UL — HIGH (ref 130–400)
POTASSIUM SERPL-MCNC: 4.6 MMOL/L — SIGNIFICANT CHANGE UP (ref 3.5–5)
POTASSIUM SERPL-SCNC: 4.6 MMOL/L — SIGNIFICANT CHANGE UP (ref 3.5–5)
PROT SERPL-MCNC: 6.8 G/DL — SIGNIFICANT CHANGE UP (ref 6–8)
RBC # BLD: 5.13 M/UL — SIGNIFICANT CHANGE UP (ref 4.7–6.1)
RBC # FLD: 13.3 % — SIGNIFICANT CHANGE UP (ref 11.5–14.5)
SODIUM SERPL-SCNC: 138 MMOL/L — SIGNIFICANT CHANGE UP (ref 135–146)
WBC # BLD: 10.73 K/UL — SIGNIFICANT CHANGE UP (ref 4.8–10.8)
WBC # FLD AUTO: 10.73 K/UL — SIGNIFICANT CHANGE UP (ref 4.8–10.8)

## 2019-02-05 RX ORDER — BUDESONIDE AND FORMOTEROL FUMARATE DIHYDRATE 160; 4.5 UG/1; UG/1
2 AEROSOL RESPIRATORY (INHALATION)
Qty: 0 | Refills: 0 | COMMUNITY

## 2019-02-05 RX ORDER — BUDESONIDE AND FORMOTEROL FUMARATE DIHYDRATE 160; 4.5 UG/1; UG/1
2 AEROSOL RESPIRATORY (INHALATION)
Qty: 1 | Refills: 0
Start: 2019-02-05

## 2019-02-05 RX ORDER — TIOTROPIUM BROMIDE 18 UG/1
1 CAPSULE ORAL; RESPIRATORY (INHALATION)
Qty: 30 | Refills: 0
Start: 2019-02-05 | End: 2019-03-06

## 2019-02-05 RX ORDER — CEFDINIR 250 MG/5ML
1 POWDER, FOR SUSPENSION ORAL
Qty: 20 | Refills: 0
Start: 2019-02-05 | End: 2019-02-14

## 2019-02-05 RX ADMIN — Medication 50 MILLIGRAM(S): at 05:20

## 2019-02-05 RX ADMIN — BUDESONIDE AND FORMOTEROL FUMARATE DIHYDRATE 2 PUFF(S): 160; 4.5 AEROSOL RESPIRATORY (INHALATION) at 07:38

## 2019-02-05 RX ADMIN — TIOTROPIUM BROMIDE 1 CAPSULE(S): 18 CAPSULE ORAL; RESPIRATORY (INHALATION) at 08:27

## 2019-02-05 RX ADMIN — CEFTRIAXONE 100 GRAM(S): 500 INJECTION, POWDER, FOR SOLUTION INTRAMUSCULAR; INTRAVENOUS at 12:52

## 2019-02-05 RX ADMIN — Medication 40 MILLIGRAM(S): at 05:20

## 2019-02-05 RX ADMIN — ENOXAPARIN SODIUM 40 MILLIGRAM(S): 100 INJECTION SUBCUTANEOUS at 12:52

## 2019-02-05 RX ADMIN — FAMOTIDINE 40 MILLIGRAM(S): 10 INJECTION INTRAVENOUS at 12:52

## 2019-02-05 RX ADMIN — Medication 81 MILLIGRAM(S): at 12:50

## 2019-02-05 NOTE — PROGRESS NOTE ADULT - SUBJECTIVE AND OBJECTIVE BOX
JOSELIN MURPHY  69y  Male      Patient is a 69y old  Male who presents with Pneumonia (2019 00:00)      INTERVAL HPI/OVERNIGHT EVENTS:  70 yo M with COPD (no home o2), polio, lung cancer s/p lobectomy 3 years ago with recurrence 1 years ago treated with radiation presents for 3 week history of malaise intermittent fevers, cough, SOB. Pt states he has spent most of time in bed because he felt so tired. His daughter has been urging him to seek medical treatment over these 3 weeks and he decided to do so today. He has a pulse ox at home and his Spo2 has been 87% for past few days when normally it is 93%. He has not tried anything to help his symptoms. He initially had rhinorrhea and myalgias 3 weeks ago but have since resolved. Unaware of any exacerbating of alleviating factors. Follows with numerous subspecialist all of which are located in [place].       REVIEW OF SYSTEMS:  CONSTITUTIONAL: fever, weight loss and fatigue  EYES: No eye pain, visual disturbances, or discharge  ENMT:  No difficulty hearing, tinnitus, vertigo; No sinus or throat pain  NECK: No pain or stiffness  RESPIRATORY: cough and shortness of breath  CARDIOVASCULAR: No chest pain, palpitations, dizziness, or leg swelling  GASTROINTESTINAL: No abdominal or epigastric pain. No nausea, vomiting, or hematemesis; No diarrhea or constipation. No melena or hematochezia.  GENITOURINARY: No dysuria, frequency, hematuria, or incontinence  NEUROLOGICAL: No headaches, memory loss, loss of strength, numbness, or tremors  SKIN: No itching, burning, rashes, or lesions   LYMPH NODES: No enlarged glands  ENDOCRINE: No heat or cold intolerance; No hair loss  MUSCULOSKELETAL: No joint pain or swelling; No muscle, back, or extremity pain  PSYCHIATRIC: No depression, anxiety, mood swings, or difficulty sleeping  HEME/LYMPH: No easy bruising, or bleeding gums  ALLERY AND IMMUNOLOGIC: No hives or eczema    Vital Signs Last 24 Hrs  T(C): 36.1 (2019 05:15), Max: 36.3 (2019 14:17)  T(F): 97 (2019 05:15), Max: 97.4 (2019 14:17)  HR: 70 (2019 05:15) (70 - 74)  BP: 116/62 (2019 05:15) (116/62 - 121/60)  BP(mean): --  RR: 16 (2019 05:15) (16 - 18)  SpO2: --93% on room air      PHYSICAL EXAM:  GENERAL: NAD, well-groomed, well-developed  HEAD:  Atraumatic, Normocephalic  EYES: EOMI, PERRLA, conjunctiva and sclera clear  ENMT: No tonsillar erythema, exudates, or enlargement; Moist mucous membranes, Good dentition, No lesions  NECK: Supple, No JVD, Normal thyroid  NERVOUS SYSTEM:  Alert & Oriented X3, Good concentration; Motor Strength 5/5 B/L upper and lower extremities; DTRs 2+ intact and symmetric  CHEST/LUNG: b/l expiratory wheezing  HEART: Regular rate and rhythm; No murmurs, rubs, or gallops  ABDOMEN: Soft, Nontender, Nondistended; Bowel sounds present  EXTREMITIES:  2+ Peripheral Pulses, No clubbing, cyanosis, or edema  LYMPH: No lymphadenopathy noted  SKIN: No rashes or lesions    Consultant(s) Notes Reviewed:  [x ] YES  [ ] NO  Care Discussed with Consultants/Other Providers [ x] YES  [ ] NO    LAB:                        15.4   10.73 )-----------( 459      ( 2019 09:33 )             47.6     02-05    138  |  98  |  25<H>  ----------------------------<  131<H>  4.6   |  26  |  0.7    Ca    9.2      2019 09:33  Mg     1.8     02-04    TPro  6.8  /  Alb  3.7  /  TBili  0.3  /  DBili  x   /  AST  16  /  ALT  19  /  AlkPhos  139<H>  02-05      Daily     Daily   Drug Dosing Weight  Height (cm): 175.26 (2019 01:31)  Weight (kg): 70 (2019 01:31)  BMI (kg/m2): 22.8 (2019 01:31)  BSA (m2): 1.85 (2019 01:31)  CAPILLARY BLOOD GLUCOSE        I&O's Summary    Urinalysis Basic - ( 2019 14:19 )    Color: Yellow / Appearance: Clear / S.020 / pH: x  Gluc: x / Ketone: Trace  / Bili: Negative / Urobili: 0.2 mg/dL   Blood: x / Protein: 30 mg/dL / Nitrite: Negative   Leuk Esterase: Negative / RBC: x / WBC 3-5 /HPF   Sq Epi: x / Non Sq Epi: x / Bacteria: x      RADIOLOGY & ADDITIONAL TESTS:    Imaging Personally Reviewed:  [x] YES  [ ] NO      MEDICATIONS  (STANDING):  aspirin enteric coated 81 milliGRAM(s) Oral daily  azithromycin  IVPB 500 milliGRAM(s) IV Intermittent every 24 hours  buDESOnide 160 MICROgram(s)/formoterol 4.5 MICROgram(s) Inhaler 2 Puff(s) Inhalation two times a day  cefTRIAXone   IVPB 1 Gram(s) IV Intermittent every 24 hours  enoxaparin Injectable 40 milliGRAM(s) SubCutaneous every 24 hours  famotidine    Tablet 40 milliGRAM(s) Oral daily  methylPREDNISolone sodium succinate Injectable 40 milliGRAM(s) IV Push every 12 hours  metoprolol succinate ER 50 milliGRAM(s) Oral daily  tiotropium 18 MICROgram(s) Capsule 1 Capsule(s) Inhalation daily  traZODone 50 milliGRAM(s) Oral at bedtime    MEDICATIONS  (PRN):  baclofen 10 milliGRAM(s) Oral daily PRN Muscle Spasm      Progress Note Handoff  Pending:  Consults  Tests  Results  Family Discussion:  Disposition: Home__x_/SNF____/Other______________/Unknown at this time_____

## 2019-02-05 NOTE — PROGRESS NOTE ADULT - ASSESSMENT
68 yo M with copd, lung cancer s/p resection, MI s/p stents presents for evaluation of 3 weeks of intermittent fevers, sob, malaise    Community acquired pneumonia  -c/w rocephin, azithromycin - change to po upon d/c  -tylenol for fevers  -start solumedrol, continue nebs and symbicort  -start spiriva  -no evidence of sepsis on admission    HTN  -c/w amlodipine    COPD  -c/w symbicort    History of Polio  -baclofen for cramps    DVT px  Full Code  From Home  OOB as tolerated  d/c home today

## 2019-02-05 NOTE — DISCHARGE NOTE ADULT - HOSPITAL COURSE
68 yo M with copd, lung cancer s/p resection, MI s/p stents presents for evaluation of 3 weeks of intermittent fevers, sob, malaise    Community acquired pneumonia  -c/w rocephin, azithromycin - change to po upon d/c  -tylenol for fevers  -start solumedrol, continue nebs and symbicort  -start spiriva  -no evidence of sepsis on admission    HTN  -c/w amlodipine    COPD  -c/w symbicort    History of Polio  -baclofen for cramps    DVT px  Full Code  d/c planning took over 55 min  From Home  OOB as tolerated  d/c home today

## 2019-02-05 NOTE — DISCHARGE NOTE ADULT - PLAN OF CARE
resolution of infection complete course of antibiotics take medication as prescribed take medication as prescribed  follow up with pulmonary

## 2019-02-05 NOTE — DISCHARGE NOTE ADULT - MEDICATION SUMMARY - MEDICATIONS TO STOP TAKING
I will STOP taking the medications listed below when I get home from the hospital:    Lopressor 50 mg oral tablet  -- orally once a day

## 2019-02-05 NOTE — DISCHARGE NOTE ADULT - CARE PLAN
Principal Discharge DX:	Pneumonia  Goal:	resolution of infection  Assessment and plan of treatment:	complete course of antibiotics  Secondary Diagnosis:	Chronic GERD  Assessment and plan of treatment:	take medication as prescribed  Secondary Diagnosis:	Coronary artery disease  Assessment and plan of treatment:	take medication as prescribed  Secondary Diagnosis:	Chronic obstructive pulmonary disease, unspecified COPD type  Assessment and plan of treatment:	take medication as prescribed  follow up with pulmonary

## 2019-02-05 NOTE — DISCHARGE NOTE ADULT - MEDICATION SUMMARY - MEDICATIONS TO TAKE
I will START or STAY ON the medications listed below when I get home from the hospital:    predniSONE 20 mg oral tablet  -- Starting today:  take 60mg once a day for 3  days then  take 40mg once a day for 3 days then  take 20mg once a day for 3 days then STOP  -- It is very important that you take or use this exactly as directed.  Do not skip doses or discontinue unless directed by your doctor.  Obtain medical advice before taking any non-prescription drugs as some may affect the action of this medication.  Take with food or milk.    -- Indication: For copd    Aspir 81 oral delayed release tablet  -- 1 tab(s) by mouth once a day  -- Indication: For cad    tiotropium 18 mcg inhalation capsule  -- 1 cap(s) inhaled once a day  -- Indication: For copd    Symbicort 160 mcg-4.5 mcg/inh inhalation aerosol  -- 2 puff(s) inhaled 2 times a day  -- Indication: For copd    amLODIPine 5 mg oral tablet  -- 1 tab(s) by mouth once a day  -- Indication: For Hypertension    cefdinir 300 mg oral capsule  -- 1 cap(s) by mouth 2 times a day   -- Finish all this medication unless otherwise directed by prescriber.    -- Indication: For PNEUMONIA    raNITIdine 300 mg oral capsule  -- 1 cap(s) by mouth once a day (at bedtime)  -- Indication: For gerd    baclofen  -- orally once a day  -- Indication: For muscle relaxants

## 2019-02-05 NOTE — DISCHARGE NOTE ADULT - PATIENT PORTAL LINK FT
You can access the [facility] Patient Portal, offered by [facility], by registering with the following website: [email or web address]

## 2019-02-08 DIAGNOSIS — J18.9 PNEUMONIA, UNSPECIFIED ORGANISM: ICD-10-CM

## 2019-02-08 DIAGNOSIS — K21.9 GASTRO-ESOPHAGEAL REFLUX DISEASE WITHOUT ESOPHAGITIS: ICD-10-CM

## 2019-02-08 DIAGNOSIS — Z95.5 PRESENCE OF CORONARY ANGIOPLASTY IMPLANT AND GRAFT: ICD-10-CM

## 2019-02-08 DIAGNOSIS — Z79.899 OTHER LONG TERM (CURRENT) DRUG THERAPY: ICD-10-CM

## 2019-02-08 DIAGNOSIS — C34.91 MALIGNANT NEOPLASM OF UNSPECIFIED PART OF RIGHT BRONCHUS OR LUNG: ICD-10-CM

## 2019-02-08 DIAGNOSIS — Z92.3 PERSONAL HISTORY OF IRRADIATION: ICD-10-CM

## 2019-02-08 DIAGNOSIS — I25.10 ATHEROSCLEROTIC HEART DISEASE OF NATIVE CORONARY ARTERY WITHOUT ANGINA PECTORIS: ICD-10-CM

## 2019-02-08 DIAGNOSIS — Z96.1 PRESENCE OF INTRAOCULAR LENS: ICD-10-CM

## 2019-02-08 DIAGNOSIS — I25.2 OLD MYOCARDIAL INFARCTION: ICD-10-CM

## 2019-02-08 DIAGNOSIS — F17.210 NICOTINE DEPENDENCE, CIGARETTES, UNCOMPLICATED: ICD-10-CM

## 2019-02-08 DIAGNOSIS — Z86.12 PERSONAL HISTORY OF POLIOMYELITIS: ICD-10-CM

## 2019-02-08 DIAGNOSIS — Z98.41 CATARACT EXTRACTION STATUS, RIGHT EYE: ICD-10-CM

## 2019-02-08 DIAGNOSIS — Z90.2 ACQUIRED ABSENCE OF LUNG [PART OF]: ICD-10-CM

## 2019-02-08 DIAGNOSIS — Z79.82 LONG TERM (CURRENT) USE OF ASPIRIN: ICD-10-CM

## 2019-02-08 DIAGNOSIS — J44.0 CHRONIC OBSTRUCTIVE PULMONARY DISEASE WITH (ACUTE) LOWER RESPIRATORY INFECTION: ICD-10-CM

## 2019-05-23 NOTE — ED PROVIDER NOTE - CARE PLAN
Hospital Medicine Progress Note, Adult, Complex               Author: Elvira Cox Date & Time created: 5/23/2019  5:54 AM     CC: brain abscesses    Interval History:  ID stopped abx  Note indicates request for CHAS (previously negative) in light of increased brain lesions and aspiration of the abscess in the iliacus  Patient says pain controlled  Feels good    Review of Systems:  Review of Systems   Constitutional: Negative for chills and fever.   HENT: Negative for sore throat.    Respiratory: Negative for cough and shortness of breath.    Cardiovascular: Negative for chest pain and palpitations.   Gastrointestinal: Negative for abdominal pain, constipation, nausea and vomiting.   Musculoskeletal: Positive for joint pain (hips).   Neurological: Negative for headaches.       T 97.4P75BP 129/74 RR 18 SaO2 95% I/O1.8/1.3 BM 5/22  Physical Exam   Constitutional: She appears well-developed. No distress.   HENT:   Head: Normocephalic and atraumatic.   Eyes: Conjunctivae are normal. Right eye exhibits no discharge. Left eye exhibits no discharge. No scleral icterus.   Neck: No tracheal deviation present.   Cardiovascular: Normal rate, regular rhythm and intact distal pulses.    Pulmonary/Chest: Effort normal. No respiratory distress. She has no wheezes.   Abdominal: Soft. Bowel sounds are normal. She exhibits no distension. There is no tenderness.   Musculoskeletal: She exhibits no edema.   Neurological: She is alert.        Skin: Skin is warm.   Vitals reviewed.      Labs:          Recent Labs      05/22/19 0206   SODIUM  129*   POTASSIUM  4.8   CHLORIDE  104   CO2  18*   BUN  19   CREATININE  0.49*   CALCIUM  10.2     Recent Labs      05/22/19 0206   ALTSGPT  20   ASTSGOT  40   ALKPHOSPHAT  186*   TBILIRUBIN  0.5   GLUCOSE  85     Recent Labs      05/22/19 0206   RBC  4.84   HEMOGLOBIN  13.1   HEMATOCRIT  40.5   PLATELETCT  243     Recent Labs      05/22/19 0206   WBC  6.8   ASTSGOT  40   ALTSGPT  20    ALKPHOSPHAT  186*   TBILIRUBIN  0.5          GI/Nutrition:  Orders Placed This Encounter   Procedures   • Diet Order Diabetic     Standing Status:   Standing     Number of Occurrences:   1     Order Specific Question:   Diet:     Answer:   Diabetic [3]     Order Specific Question:   Texture/Fiber modifications:     Answer:   Dysphagia 3(Mechanical Soft)specify fluid consistency(question 6) [3]     Order Specific Question:   Consistency/Fluid modifications:     Answer:   Thin Liquids [3]     MRI brain 5/20  1.  Increase in number of innumerable small ovoid enhancing lesions throughout the brain parenchyma. These lesions may represent microabscesses of either bacterial or fungal origin or possibly brain metastases.    2.  Age-related cerebral atrophy.    MRI pelvis 5/20    1. Interval decrease in size of the collection in the right gluteal medius muscle, measuring 1.4 x 1.9 cm, previously 2.3 x 2.8 cm. There is minimal surrounding stranding.    Mild heterogeneity and stranding in the overlying right gluteus cornelio muscle without discrete collection, could relate to reactive change or phlegmon.    2. Grossly unchanged in size of the multiloculated collection in the right iliacus muscle, measuring 2.4 x 3.5 cm.    Medical Decision Making, by Problem:  Brain abscesses vs. metastatic disease   -Vanco, cefepime, flagyl 5/25   -repeat MRI with progression   -too small to sample per IR   -Prior CHAS negative, will call cardiology for another CHAS per ID request  R gluteal/iliac abscess   -MRI with improved R gluteus abscess, unchanged R iliacus abscess (request CT aspiration-need to hold xarelto 2 days prior so won't happen until monday)   -continue idopatch/voltaren gel  H/o sacral fx and pinning   -pin unable to removed until healed (sometime this summer)  H/o breast cancer with reconstruction  DM2   -metformin  RLS   -mirapex   H/o DVT   -xarelto, hold starting Saturday for CT aspiration  Neutropenia-resolved   -granix  5/10  R lung pulm nodule   -outpatient f/u  HTN   -norvasc, cozaar, metoprolol  HLD   -lipitor  GERD  Hyponatremia  Hypercalcemia   -sensipar  Debility   -PT      Quality-Core Measures   Reviewed items::  Medications reviewed  Sue catheter::  No Sue  Central line in place:  Need for access  DVT: xarelto.       Principal Discharge DX:	Pneumonia

## 2020-09-23 ENCOUNTER — EMERGENCY (EMERGENCY)
Facility: HOSPITAL | Age: 70
LOS: 0 days | Discharge: HOME | End: 2020-09-23
Attending: EMERGENCY MEDICINE | Admitting: EMERGENCY MEDICINE
Payer: COMMERCIAL

## 2020-09-23 VITALS — WEIGHT: 136.91 LBS | HEIGHT: 69 IN

## 2020-09-23 VITALS
DIASTOLIC BLOOD PRESSURE: 78 MMHG | OXYGEN SATURATION: 99 % | HEART RATE: 88 BPM | TEMPERATURE: 97 F | RESPIRATION RATE: 20 BRPM | SYSTOLIC BLOOD PRESSURE: 142 MMHG

## 2020-09-23 DIAGNOSIS — Z98.890 OTHER SPECIFIED POSTPROCEDURAL STATES: Chronic | ICD-10-CM

## 2020-09-23 DIAGNOSIS — M25.559 PAIN IN UNSPECIFIED HIP: ICD-10-CM

## 2020-09-23 DIAGNOSIS — Z95.5 PRESENCE OF CORONARY ANGIOPLASTY IMPLANT AND GRAFT: Chronic | ICD-10-CM

## 2020-09-23 DIAGNOSIS — H26.9 UNSPECIFIED CATARACT: Chronic | ICD-10-CM

## 2020-09-23 DIAGNOSIS — R10.32 LEFT LOWER QUADRANT PAIN: ICD-10-CM

## 2020-09-23 DIAGNOSIS — Z90.49 ACQUIRED ABSENCE OF OTHER SPECIFIED PARTS OF DIGESTIVE TRACT: Chronic | ICD-10-CM

## 2020-09-23 DIAGNOSIS — Z90.2 ACQUIRED ABSENCE OF LUNG [PART OF]: Chronic | ICD-10-CM

## 2020-09-23 LAB
ALBUMIN SERPL ELPH-MCNC: 4.5 G/DL — SIGNIFICANT CHANGE UP (ref 3.5–5.2)
ALP SERPL-CCNC: 86 U/L — SIGNIFICANT CHANGE UP (ref 30–115)
ALT FLD-CCNC: 9 U/L — SIGNIFICANT CHANGE UP (ref 0–41)
ANION GAP SERPL CALC-SCNC: 13 MMOL/L — SIGNIFICANT CHANGE UP (ref 7–14)
APPEARANCE UR: CLEAR — SIGNIFICANT CHANGE UP
AST SERPL-CCNC: 13 U/L — SIGNIFICANT CHANGE UP (ref 0–41)
BACTERIA # UR AUTO: ABNORMAL
BASOPHILS # BLD AUTO: 0.06 K/UL — SIGNIFICANT CHANGE UP (ref 0–0.2)
BASOPHILS NFR BLD AUTO: 0.6 % — SIGNIFICANT CHANGE UP (ref 0–1)
BILIRUB SERPL-MCNC: 0.5 MG/DL — SIGNIFICANT CHANGE UP (ref 0.2–1.2)
BILIRUB UR-MCNC: ABNORMAL
BUN SERPL-MCNC: 15 MG/DL — SIGNIFICANT CHANGE UP (ref 10–20)
CALCIUM SERPL-MCNC: 9.8 MG/DL — SIGNIFICANT CHANGE UP (ref 8.5–10.1)
CHLORIDE SERPL-SCNC: 97 MMOL/L — LOW (ref 98–110)
CO2 SERPL-SCNC: 28 MMOL/L — SIGNIFICANT CHANGE UP (ref 17–32)
COLOR SPEC: YELLOW — SIGNIFICANT CHANGE UP
CREAT SERPL-MCNC: 0.9 MG/DL — SIGNIFICANT CHANGE UP (ref 0.7–1.5)
DIFF PNL FLD: NEGATIVE — SIGNIFICANT CHANGE UP
EOSINOPHIL # BLD AUTO: 0.1 K/UL — SIGNIFICANT CHANGE UP (ref 0–0.7)
EOSINOPHIL NFR BLD AUTO: 1 % — SIGNIFICANT CHANGE UP (ref 0–8)
EPI CELLS # UR: ABNORMAL /HPF
GLUCOSE SERPL-MCNC: 95 MG/DL — SIGNIFICANT CHANGE UP (ref 70–99)
GLUCOSE UR QL: NEGATIVE MG/DL — SIGNIFICANT CHANGE UP
HCT VFR BLD CALC: 52.4 % — HIGH (ref 42–52)
HGB BLD-MCNC: 17 G/DL — SIGNIFICANT CHANGE UP (ref 14–18)
IMM GRANULOCYTES NFR BLD AUTO: 0.3 % — SIGNIFICANT CHANGE UP (ref 0.1–0.3)
KETONES UR-MCNC: NEGATIVE — SIGNIFICANT CHANGE UP
LEUKOCYTE ESTERASE UR-ACNC: NEGATIVE — SIGNIFICANT CHANGE UP
LYMPHOCYTES # BLD AUTO: 1.97 K/UL — SIGNIFICANT CHANGE UP (ref 1.2–3.4)
LYMPHOCYTES # BLD AUTO: 19.2 % — LOW (ref 20.5–51.1)
MCHC RBC-ENTMCNC: 29.2 PG — SIGNIFICANT CHANGE UP (ref 27–31)
MCHC RBC-ENTMCNC: 32.4 G/DL — SIGNIFICANT CHANGE UP (ref 32–37)
MCV RBC AUTO: 89.9 FL — SIGNIFICANT CHANGE UP (ref 80–94)
MONOCYTES # BLD AUTO: 0.76 K/UL — HIGH (ref 0.1–0.6)
MONOCYTES NFR BLD AUTO: 7.4 % — SIGNIFICANT CHANGE UP (ref 1.7–9.3)
NEUTROPHILS # BLD AUTO: 7.36 K/UL — HIGH (ref 1.4–6.5)
NEUTROPHILS NFR BLD AUTO: 71.5 % — SIGNIFICANT CHANGE UP (ref 42.2–75.2)
NITRITE UR-MCNC: NEGATIVE — SIGNIFICANT CHANGE UP
NRBC # BLD: 0 /100 WBCS — SIGNIFICANT CHANGE UP (ref 0–0)
PH UR: 5.5 — SIGNIFICANT CHANGE UP (ref 5–8)
PLATELET # BLD AUTO: 221 K/UL — SIGNIFICANT CHANGE UP (ref 130–400)
POTASSIUM SERPL-MCNC: 4.1 MMOL/L — SIGNIFICANT CHANGE UP (ref 3.5–5)
POTASSIUM SERPL-SCNC: 4.1 MMOL/L — SIGNIFICANT CHANGE UP (ref 3.5–5)
PROT SERPL-MCNC: 7 G/DL — SIGNIFICANT CHANGE UP (ref 6–8)
PROT UR-MCNC: ABNORMAL MG/DL
RBC # BLD: 5.83 M/UL — SIGNIFICANT CHANGE UP (ref 4.7–6.1)
RBC # FLD: 13.2 % — SIGNIFICANT CHANGE UP (ref 11.5–14.5)
SODIUM SERPL-SCNC: 138 MMOL/L — SIGNIFICANT CHANGE UP (ref 135–146)
SP GR SPEC: >=1.03 (ref 1.01–1.03)
UROBILINOGEN FLD QL: 1 MG/DL (ref 0.2–0.2)
WBC # BLD: 10.28 K/UL — SIGNIFICANT CHANGE UP (ref 4.8–10.8)
WBC # FLD AUTO: 10.28 K/UL — SIGNIFICANT CHANGE UP (ref 4.8–10.8)

## 2020-09-23 PROCEDURE — 74177 CT ABD & PELVIS W/CONTRAST: CPT | Mod: 26

## 2020-09-23 PROCEDURE — 99285 EMERGENCY DEPT VISIT HI MDM: CPT

## 2020-09-23 PROCEDURE — 73502 X-RAY EXAM HIP UNI 2-3 VIEWS: CPT | Mod: 26,LT

## 2020-09-23 PROCEDURE — 72170 X-RAY EXAM OF PELVIS: CPT | Mod: 26,59

## 2020-09-23 RX ORDER — SODIUM CHLORIDE 9 MG/ML
500 INJECTION INTRAMUSCULAR; INTRAVENOUS; SUBCUTANEOUS ONCE
Refills: 0 | Status: COMPLETED | OUTPATIENT
Start: 2020-09-23 | End: 2020-09-23

## 2020-09-23 RX ADMIN — SODIUM CHLORIDE 1 MILLILITER(S): 9 INJECTION INTRAMUSCULAR; INTRAVENOUS; SUBCUTANEOUS at 15:44

## 2020-09-23 NOTE — ED PROVIDER NOTE - OBJECTIVE STATEMENT
this is 69 yo male who presents to ed for evaluation of left side pain .patient has been having pain for couple of weeks . patient states pain is not worse with movement. patient admits that when it started weeks ago he did have vomiting

## 2020-09-23 NOTE — ED PROVIDER NOTE - CLINICAL SUMMARY MEDICAL DECISION MAKING FREE TEXT BOX
pt presenting with L hip/LLQ pain, worse in past 2d. assoc nausea/vomiting, which has since resolved. No fevers/chills, chest pain, shortness of breath, dysuria/hematuria, back pain, numbness/focal weakness. Ambulatory. No trauma. Per pt, pain now improved. brought in by daughter for eval. Well appearing, NAD, non toxic. NCAT PERRLA EOMI neck supple non tender cta bl normal wob rrr abdomen s nt nd no rebound no guarding WWPx4 neuro non focal. labs imaging reviewed. Aware of all results, given a copy of all available results, comfortable with discharge and follow-up outpatient, strict return precautions given. Endorses understanding of all of this and aware that they can return at any time for new or concerning symptoms. No further questions or concerns at this time

## 2020-09-23 NOTE — ED PROVIDER NOTE - NS ED ROS FT
Review of Systems:  	•	CONSTITUTIONAL - no fever, no diaphoresis, no chills  	•	SKIN - no rash  	•	HEMATOLOGIC - no bleeding, no bruising  	•	EYES - no eye pain, no blurry vision  	•	ENT - no change in hearing, no sore throat, no ear pain or tinnitus  	•	RESPIRATORY - no shortness of breath, no cough  	•	CARDIAC - no chest pain, no palpitations  	•	GI -  abd pain, no nausea, no vomiting, no diarrhea, no constipation  	•	GENITO-URINARY - no discharge, no dysuria; no hematuria, no increased urinary frequency  	•	MUSCULOSKELETAL -left hip pain , no swelling, no redness  	•	NEUROLOGIC - no weakness, no headache, no paresthesias, no LOC  	•	PSYCH - no anxiety, non suicidal, non homicidal, no hallucination, no depression

## 2020-09-23 NOTE — ED PROVIDER NOTE - PATIENT PORTAL LINK FT
You can access the FollowMyHealth Patient Portal offered by [facility] by registering at the following website: [email or web address]. By joining [employer]’s FollowMyHealth portal, you will also be able to view your health information using other applications (apps) compatible with our system.

## 2020-09-23 NOTE — ED PROVIDER NOTE - CARE PROVIDER_API CALL
Hali Avila (MD)  Gastroenterology  [address] [place] [address]  Phone: [phone]  Fax: [phone]  Follow Up Time:

## 2020-09-24 LAB
CULTURE RESULTS: SIGNIFICANT CHANGE UP
SPECIMEN SOURCE: SIGNIFICANT CHANGE UP

## 2020-09-24 NOTE — ED POST DISCHARGE NOTE - RESULT SUMMARY
CT ABD- MILD THICKENING OF MID-SIGMOID, COLONOSCOPY ADVISED. LEFT LOWER LOBE 5 MM LUNG NODULE- CHEST CT IN 12 MONTS ADVISED.

## 2022-05-17 ENCOUNTER — EMERGENCY (EMERGENCY)
Facility: HOSPITAL | Age: 72
LOS: 0 days | Discharge: HOME | End: 2022-05-17
Attending: EMERGENCY MEDICINE | Admitting: EMERGENCY MEDICINE
Payer: COMMERCIAL

## 2022-05-17 VITALS
HEART RATE: 131 BPM | RESPIRATION RATE: 20 BRPM | HEIGHT: 69 IN | DIASTOLIC BLOOD PRESSURE: 74 MMHG | OXYGEN SATURATION: 95 % | SYSTOLIC BLOOD PRESSURE: 140 MMHG | TEMPERATURE: 98 F | WEIGHT: 156.97 LBS

## 2022-05-17 VITALS
SYSTOLIC BLOOD PRESSURE: 119 MMHG | DIASTOLIC BLOOD PRESSURE: 61 MMHG | HEART RATE: 65 BPM | OXYGEN SATURATION: 97 % | RESPIRATION RATE: 18 BRPM

## 2022-05-17 DIAGNOSIS — Z90.2 ACQUIRED ABSENCE OF LUNG [PART OF]: ICD-10-CM

## 2022-05-17 DIAGNOSIS — R00.0 TACHYCARDIA, UNSPECIFIED: ICD-10-CM

## 2022-05-17 DIAGNOSIS — I25.10 ATHEROSCLEROTIC HEART DISEASE OF NATIVE CORONARY ARTERY WITHOUT ANGINA PECTORIS: ICD-10-CM

## 2022-05-17 DIAGNOSIS — E83.42 HYPOMAGNESEMIA: ICD-10-CM

## 2022-05-17 DIAGNOSIS — Z90.49 ACQUIRED ABSENCE OF OTHER SPECIFIED PARTS OF DIGESTIVE TRACT: Chronic | ICD-10-CM

## 2022-05-17 DIAGNOSIS — Z85.118 PERSONAL HISTORY OF OTHER MALIGNANT NEOPLASM OF BRONCHUS AND LUNG: ICD-10-CM

## 2022-05-17 DIAGNOSIS — Z98.890 OTHER SPECIFIED POSTPROCEDURAL STATES: Chronic | ICD-10-CM

## 2022-05-17 DIAGNOSIS — Z95.5 PRESENCE OF CORONARY ANGIOPLASTY IMPLANT AND GRAFT: Chronic | ICD-10-CM

## 2022-05-17 DIAGNOSIS — Z86.12 PERSONAL HISTORY OF POLIOMYELITIS: ICD-10-CM

## 2022-05-17 DIAGNOSIS — Z90.89 ACQUIRED ABSENCE OF OTHER ORGANS: ICD-10-CM

## 2022-05-17 DIAGNOSIS — I10 ESSENTIAL (PRIMARY) HYPERTENSION: ICD-10-CM

## 2022-05-17 DIAGNOSIS — Z79.82 LONG TERM (CURRENT) USE OF ASPIRIN: ICD-10-CM

## 2022-05-17 DIAGNOSIS — I45.10 UNSPECIFIED RIGHT BUNDLE-BRANCH BLOCK: ICD-10-CM

## 2022-05-17 DIAGNOSIS — Z90.2 ACQUIRED ABSENCE OF LUNG [PART OF]: Chronic | ICD-10-CM

## 2022-05-17 DIAGNOSIS — J44.9 CHRONIC OBSTRUCTIVE PULMONARY DISEASE, UNSPECIFIED: ICD-10-CM

## 2022-05-17 DIAGNOSIS — K21.9 GASTRO-ESOPHAGEAL REFLUX DISEASE WITHOUT ESOPHAGITIS: ICD-10-CM

## 2022-05-17 DIAGNOSIS — Z90.49 ACQUIRED ABSENCE OF OTHER SPECIFIED PARTS OF DIGESTIVE TRACT: ICD-10-CM

## 2022-05-17 DIAGNOSIS — H26.9 UNSPECIFIED CATARACT: Chronic | ICD-10-CM

## 2022-05-17 DIAGNOSIS — Z95.5 PRESENCE OF CORONARY ANGIOPLASTY IMPLANT AND GRAFT: ICD-10-CM

## 2022-05-17 DIAGNOSIS — Z87.891 PERSONAL HISTORY OF NICOTINE DEPENDENCE: ICD-10-CM

## 2022-05-17 DIAGNOSIS — I25.2 OLD MYOCARDIAL INFARCTION: ICD-10-CM

## 2022-05-17 LAB
ALBUMIN SERPL ELPH-MCNC: 4.8 G/DL — SIGNIFICANT CHANGE UP (ref 3.5–5.2)
ALP SERPL-CCNC: 94 U/L — SIGNIFICANT CHANGE UP (ref 30–115)
ALT FLD-CCNC: 10 U/L — SIGNIFICANT CHANGE UP (ref 0–41)
ANION GAP SERPL CALC-SCNC: 14 MMOL/L — SIGNIFICANT CHANGE UP (ref 7–14)
AST SERPL-CCNC: 15 U/L — SIGNIFICANT CHANGE UP (ref 0–41)
BASOPHILS # BLD AUTO: 0.06 K/UL — SIGNIFICANT CHANGE UP (ref 0–0.2)
BASOPHILS NFR BLD AUTO: 0.7 % — SIGNIFICANT CHANGE UP (ref 0–1)
BILIRUB SERPL-MCNC: 0.4 MG/DL — SIGNIFICANT CHANGE UP (ref 0.2–1.2)
BUN SERPL-MCNC: 22 MG/DL — HIGH (ref 10–20)
CALCIUM SERPL-MCNC: 9.6 MG/DL — SIGNIFICANT CHANGE UP (ref 8.5–10.1)
CHLORIDE SERPL-SCNC: 98 MMOL/L — SIGNIFICANT CHANGE UP (ref 98–110)
CO2 SERPL-SCNC: 26 MMOL/L — SIGNIFICANT CHANGE UP (ref 17–32)
CREAT SERPL-MCNC: 1.2 MG/DL — SIGNIFICANT CHANGE UP (ref 0.7–1.5)
D DIMER BLD IA.RAPID-MCNC: 247 NG/ML DDU — HIGH (ref 0–230)
EGFR: 64 ML/MIN/1.73M2 — SIGNIFICANT CHANGE UP
EOSINOPHIL # BLD AUTO: 0.24 K/UL — SIGNIFICANT CHANGE UP (ref 0–0.7)
EOSINOPHIL NFR BLD AUTO: 2.8 % — SIGNIFICANT CHANGE UP (ref 0–8)
FLUAV AG NPH QL: SIGNIFICANT CHANGE UP
FLUBV AG NPH QL: SIGNIFICANT CHANGE UP
GLUCOSE SERPL-MCNC: 180 MG/DL — HIGH (ref 70–99)
HCT VFR BLD CALC: 49 % — SIGNIFICANT CHANGE UP (ref 42–52)
HGB BLD-MCNC: 16 G/DL — SIGNIFICANT CHANGE UP (ref 14–18)
IMM GRANULOCYTES NFR BLD AUTO: 0.2 % — SIGNIFICANT CHANGE UP (ref 0.1–0.3)
LYMPHOCYTES # BLD AUTO: 1.58 K/UL — SIGNIFICANT CHANGE UP (ref 1.2–3.4)
LYMPHOCYTES # BLD AUTO: 18.5 % — LOW (ref 20.5–51.1)
MAGNESIUM SERPL-MCNC: 1.4 MG/DL — LOW (ref 1.8–2.4)
MCHC RBC-ENTMCNC: 30 PG — SIGNIFICANT CHANGE UP (ref 27–31)
MCHC RBC-ENTMCNC: 32.7 G/DL — SIGNIFICANT CHANGE UP (ref 32–37)
MCV RBC AUTO: 91.9 FL — SIGNIFICANT CHANGE UP (ref 80–94)
MONOCYTES # BLD AUTO: 0.69 K/UL — HIGH (ref 0.1–0.6)
MONOCYTES NFR BLD AUTO: 8.1 % — SIGNIFICANT CHANGE UP (ref 1.7–9.3)
NEUTROPHILS # BLD AUTO: 5.97 K/UL — SIGNIFICANT CHANGE UP (ref 1.4–6.5)
NEUTROPHILS NFR BLD AUTO: 69.7 % — SIGNIFICANT CHANGE UP (ref 42.2–75.2)
NRBC # BLD: 0 /100 WBCS — SIGNIFICANT CHANGE UP (ref 0–0)
PLATELET # BLD AUTO: 214 K/UL — SIGNIFICANT CHANGE UP (ref 130–400)
POTASSIUM SERPL-MCNC: 3.7 MMOL/L — SIGNIFICANT CHANGE UP (ref 3.5–5)
POTASSIUM SERPL-SCNC: 3.7 MMOL/L — SIGNIFICANT CHANGE UP (ref 3.5–5)
PROT SERPL-MCNC: 7.2 G/DL — SIGNIFICANT CHANGE UP (ref 6–8)
RBC # BLD: 5.33 M/UL — SIGNIFICANT CHANGE UP (ref 4.7–6.1)
RBC # FLD: 13.2 % — SIGNIFICANT CHANGE UP (ref 11.5–14.5)
RSV RNA NPH QL NAA+NON-PROBE: SIGNIFICANT CHANGE UP
SARS-COV-2 RNA SPEC QL NAA+PROBE: SIGNIFICANT CHANGE UP
SODIUM SERPL-SCNC: 138 MMOL/L — SIGNIFICANT CHANGE UP (ref 135–146)
TROPONIN T SERPL-MCNC: <0.01 NG/ML — SIGNIFICANT CHANGE UP
WBC # BLD: 8.56 K/UL — SIGNIFICANT CHANGE UP (ref 4.8–10.8)
WBC # FLD AUTO: 8.56 K/UL — SIGNIFICANT CHANGE UP (ref 4.8–10.8)

## 2022-05-17 PROCEDURE — 71275 CT ANGIOGRAPHY CHEST: CPT | Mod: 26,MA

## 2022-05-17 PROCEDURE — 93010 ELECTROCARDIOGRAM REPORT: CPT

## 2022-05-17 PROCEDURE — 93010 ELECTROCARDIOGRAM REPORT: CPT | Mod: 77

## 2022-05-17 PROCEDURE — 71045 X-RAY EXAM CHEST 1 VIEW: CPT | Mod: 26

## 2022-05-17 PROCEDURE — 99285 EMERGENCY DEPT VISIT HI MDM: CPT

## 2022-05-17 RX ORDER — MAGNESIUM SULFATE 500 MG/ML
2 VIAL (ML) INJECTION ONCE
Refills: 0 | Status: COMPLETED | OUTPATIENT
Start: 2022-05-17 | End: 2022-05-17

## 2022-05-17 RX ORDER — RANITIDINE HYDROCHLORIDE 150 MG/1
1 TABLET, FILM COATED ORAL
Qty: 0 | Refills: 0 | DISCHARGE

## 2022-05-17 RX ORDER — BACLOFEN 100 %
0 POWDER (GRAM) MISCELLANEOUS
Qty: 0 | Refills: 0 | DISCHARGE

## 2022-05-17 RX ORDER — ASPIRIN/CALCIUM CARB/MAGNESIUM 324 MG
1 TABLET ORAL
Qty: 0 | Refills: 0 | DISCHARGE

## 2022-05-17 RX ORDER — METOPROLOL TARTRATE 50 MG
5 TABLET ORAL ONCE
Refills: 0 | Status: COMPLETED | OUTPATIENT
Start: 2022-05-17 | End: 2022-05-17

## 2022-05-17 RX ORDER — SODIUM CHLORIDE 9 MG/ML
1000 INJECTION INTRAMUSCULAR; INTRAVENOUS; SUBCUTANEOUS ONCE
Refills: 0 | Status: COMPLETED | OUTPATIENT
Start: 2022-05-17 | End: 2022-05-17

## 2022-05-17 RX ADMIN — Medication 5 MILLIGRAM(S): at 12:35

## 2022-05-17 RX ADMIN — SODIUM CHLORIDE 1000 MILLILITER(S): 9 INJECTION INTRAMUSCULAR; INTRAVENOUS; SUBCUTANEOUS at 12:35

## 2022-05-17 RX ADMIN — Medication 150 GRAM(S): at 15:42

## 2022-05-17 NOTE — ED ADULT TRIAGE NOTE - CHIEF COMPLAINT QUOTE
" I checeked my oxygen level with a pulse ox and my heart rate was high" denies shortness of breathe other than his usual lung ca symptoms

## 2022-05-17 NOTE — ED PROVIDER NOTE - CARE PLAN
1 Principal Discharge DX:	Sinus tachycardia   Principal Discharge DX:	Sinus tachycardia  Secondary Diagnosis:	Hypomagnesemia

## 2022-05-17 NOTE — ED PROVIDER NOTE - PHYSICAL EXAMINATION
Vital Signs: I have reviewed the initial vital signs.  Constitutional: well-nourished, no acute distress, normocephalic  Eyes: PERRLA, EOMI, , clear conjunctiva  ENT: MMM, TM b/l clear , no nasal congestion  Cardiovascular: tachycardia, regular rhythm, no murmur appreciated  Respiratory: unlabored respiratory effort, wheezing throughout to auscultation bilaterally  Gastrointestinal: soft, non-tender, non-distended  abdomen, no pulsatile mass  Musculoskeletal: supple neck, no lower extremity edema, no bony tenderness  Integumentary: warm, dry, no rash  Neurologic: awake, alert, cranial nerves II-XII grossly intact, extremities’ motor and sensory functions grossly intact, no focal deficits, GCS 15

## 2022-05-17 NOTE — ED PROVIDER NOTE - NS ED ATTENDING STATEMENT MOD
This was a shared visit with the DIANNE. I reviewed and verified the documentation and independently performed the documented:

## 2022-05-17 NOTE — ED PROVIDER NOTE - CARE PROVIDER_API CALL
Cortes Becker)  Critical Care Medicine; Internal Medicine; Pulmonary Disease; Sleep Medicine  [address] [place] [address]  Phone: [phone]  Fax: [phone]  Follow Up Time:     Olivier Singletary)  Cardiovascular Disease; Internal Medicine  [address] [place] [address]  Phone: [phone]  Fax: [phone]  Follow Up Time:

## 2022-05-17 NOTE — ED PROVIDER NOTE - NSICDXPASTSURGICALHX_GEN_ALL_CORE_FT
PAST SURGICAL HISTORY:  Cataract, right eye with IOL placement, Oct. 2018    H/O colonoscopy 2013    H/O heart artery stent     H/O left knee surgery TO CORRECT POLIO    History of coronary artery stent placement     History of lobectomy of lung UPPER, RIGHT    History of tonsillectomy and adenoidectomy CHILDHOOD    S/P appendectomy CHILDHOOD

## 2022-05-17 NOTE — ED PROVIDER NOTE - NSICDXPASTMEDICALHX_GEN_ALL_CORE_FT
PAST MEDICAL HISTORY:  Brain concussion October, 2017    Chronic GERD     Coronary artery disease     Lung cancer RIGHT LUNG, UPPER LOBECTOMY 2015    Myocardial infarction x 2    Polio

## 2022-05-17 NOTE — ED PROVIDER NOTE - PATIENT PORTAL LINK FT
You can access the FollowMyHealth Patient Portal offered by [facility] by registering at the following website: [email or web address]. By joining [facility]’s FollowMyHealth portal, you will also be able to view your health information using other applications (apps) compatible with our system.

## 2022-05-17 NOTE — ED PROVIDER NOTE - ATTENDING APP SHARED VISIT CONTRIBUTION OF CARE
73 yo M PMHx lung cancer with resection, HTN, COPD, CAD, presents for evaluation of elevated HR.  Pt was checking his oxygen level as per his usual routine and found HR to be high,  no CP, no SOB, no fever , no cough, no n/v. no palpitations, On exam pt in NAD AAO x 3, speaks full clear sentences, + tachycardia, Lungs with slight wheeze, no edema, abd soft nt nd

## 2022-05-17 NOTE — ED PROVIDER NOTE - CARE PROVIDERS DIRECT ADDRESSES
,DirectAddress_Unknown,[email or web address]

## 2022-05-17 NOTE — ED PROVIDER NOTE - OBJECTIVE STATEMENT
71 y/o former smoker, previous hx of lung cancer with right lobectomy, COPD, HTN, cardiac stent, polio presents to the Ed with incidental tachycardia noted when taking his pulse ox at home. no calf pain or swelling. no palpitations, dizziness, chest pain . no fevers or hemoptysis

## 2022-05-17 NOTE — ED PROVIDER NOTE - PROGRESS NOTE DETAILS
patient rpt heart rate 80s . spoke with patient and significant other regarding results of diagnositcs. will dc home with pulm follow up

## 2022-05-17 NOTE — ED PROVIDER NOTE - NSICDXFAMILYHX_GEN_ALL_CORE_FT
FAMILY HISTORY:  Grandparent  Still living? No  Family history of MI (myocardial infarction), Age at diagnosis: Age Unknown

## 2022-05-17 NOTE — ED PROVIDER NOTE - CLINICAL SUMMARY MEDICAL DECISION MAKING FREE TEXT BOX
Pt placed on cardiac monitor.  Labs obtained.  Pt given beta blocker.  Magnesium found to be low and repleted  D dimer elevated which prompted CTA.  Results reviewed and d/w pt.  HR down to pts baseline HR, Pt has been asymptomatic.  Results reviewed and d/w pt.  Copies given.  Pt will follow up with his Drs.  He is aware about incidental nodules.

## 2022-05-19 PROBLEM — Z00.00 ENCOUNTER FOR PREVENTIVE HEALTH EXAMINATION: Status: ACTIVE | Noted: 2022-05-19

## 2022-06-02 ENCOUNTER — APPOINTMENT (OUTPATIENT)
Age: 72
End: 2022-06-02
Payer: COMMERCIAL

## 2022-06-02 VITALS
BODY MASS INDEX: 24.44 KG/M2 | OXYGEN SATURATION: 92 % | RESPIRATION RATE: 14 BRPM | HEART RATE: 92 BPM | HEIGHT: 69 IN | WEIGHT: 165 LBS | DIASTOLIC BLOOD PRESSURE: 80 MMHG | SYSTOLIC BLOOD PRESSURE: 126 MMHG

## 2022-06-02 PROCEDURE — 99204 OFFICE O/P NEW MOD 45 MIN: CPT

## 2022-06-02 NOTE — DISCUSSION/SUMMARY
[FreeTextEntry1] : SEVERE COPD OXYGEN DEPENDANT NO EXACERBATION\par ACTIVE SMOKER\par TRELEGY/ PREDNISONE\par \par STRIDOR REFER TO ENT\par \par LUNG NODULE, PRIOR HX OF LUNG CANCER. CHEST CT IN 3 MONTH

## 2022-06-02 NOTE — HISTORY OF PRESENT ILLNESS
[Initial Eval - Existing Diagnosis] : an initial evaluation of an existing diagnosis of [Dyspnea on Exertion] : dyspnea on exertion [Wheezing] : wheezing [Non-Productive Cough] : non-productive cough [Currently Experiencing] : The patient is currently experiencing symptoms. [Short-Acting Beta Agonists] : short-acting beta agonists [Long-Acting Beta Agonists] : long-acting beta agonists [Inhaled Corticosteroids] : inhaled corticosteroids [Good Compliance] : good compliance with treatment [Poor Symptom Control] : poor symptom control [Smoking (Currently)] : smoking (currently) [Limited ADLs] : able to do activities of daily living with limitations [TextBox_4] : SAW PUL IN [place] SEVERE COPD OXYGEN DEPENDANT, STILL SMOKING\par LUNG CANCER SP RU LOBECTOMY 6 Y AGO SP CHEMO\par RP RECURRENCE 4 YEARS AGO SO RT NON BX\par WENT TO ED RECENTLY FOR HIGH HR, CT CHEST DONE LUNG NODULE REFERRED\par SOB ON MINIMAL EXERTION

## 2022-06-02 NOTE — PHYSICAL EXAM
[No Acute Distress] : no acute distress [Normal Oropharynx] : normal oropharynx [Normal Appearance] : normal appearance [No Neck Mass] : no neck mass [Normal Rate/Rhythm] : normal rate/rhythm [Normal S1, S2] : normal s1, s2 [No Murmurs] : no murmurs [No Abnormalities] : no abnormalities [Benign] : benign [Normal Gait] : normal gait [No Clubbing] : no clubbing [No Cyanosis] : no cyanosis [No Edema] : no edema [FROM] : FROM [Normal Color/ Pigmentation] : normal color/ pigmentation [No Focal Deficits] : no focal deficits [Oriented x3] : oriented x3 [Normal Affect] : normal affect [TextBox_68] : VU WHEEZING

## 2022-06-07 ENCOUNTER — APPOINTMENT (OUTPATIENT)
Dept: CARDIOLOGY | Facility: CLINIC | Age: 72
End: 2022-06-07
Payer: COMMERCIAL

## 2022-06-07 VITALS
WEIGHT: 170 LBS | HEIGHT: 69 IN | DIASTOLIC BLOOD PRESSURE: 68 MMHG | BODY MASS INDEX: 25.18 KG/M2 | SYSTOLIC BLOOD PRESSURE: 110 MMHG

## 2022-06-07 DIAGNOSIS — F17.200 NICOTINE DEPENDENCE, UNSPECIFIED, UNCOMPLICATED: ICD-10-CM

## 2022-06-07 PROCEDURE — 99204 OFFICE O/P NEW MOD 45 MIN: CPT

## 2022-06-07 PROCEDURE — 93000 ELECTROCARDIOGRAM COMPLETE: CPT

## 2022-06-07 NOTE — DISCUSSION/SUMMARY
[FreeTextEntry1] : 72 MI age 40. He had cardiac stent [place] about 2015. He had lobectomy for lung ca 2015. He got chemo and later recurrence and got radiation. Getting palpitations. He had sinus tach. Now better on Metoprolol xl 50. He denies sob. He gets garcia. He get chest pain. He is on home o2\par He needs a Lexiscan. He needs a echo.. He needs a MCOT.

## 2022-06-07 NOTE — PHYSICAL EXAM
[Well Developed] : well developed [Well Nourished] : well nourished [No Acute Distress] : no acute distress [Normal Conjunctiva] : normal conjunctiva [Normal Venous Pressure] : normal venous pressure [No Carotid Bruit] : no carotid bruit [Rhythm Regular] : regular [Normal S1] : normal S1 [Normal S2] : normal S2 [S3] : no S3 [S4] : no S4 [No Murmur] : no murmurs heard [Normal Rate] : the respiratory rate was normal [Decreased Breath Sounds] : breath sounds were decreased diffusely [Soft] : abdomen soft [Normal Gait] : normal gait [No Edema] : no edema [No Rash] : no rash [No Focal Deficits] : no focal deficits [Alert and Oriented] : alert and oriented

## 2022-06-07 NOTE — HISTORY OF PRESENT ILLNESS
[FreeTextEntry1] : 72 MI age 40. He had cardiac stent [place] about 2015. He had lobectomy for lung ca 2015. He got chemo and later recurrence and got radiation. Getting palpitations. He had sinus tach. Now better on Metoprolol xl 50. He denies sob. He gets garcia. He get chest pain

## 2022-06-14 ENCOUNTER — INPATIENT (INPATIENT)
Facility: HOSPITAL | Age: 72
LOS: 3 days | Discharge: HOME HEALTH | End: 2022-06-18
Attending: FAMILY MEDICINE | Admitting: FAMILY MEDICINE
Payer: COMMERCIAL

## 2022-06-14 ENCOUNTER — NON-APPOINTMENT (OUTPATIENT)
Age: 72
End: 2022-06-14

## 2022-06-14 VITALS
WEIGHT: 160.06 LBS | RESPIRATION RATE: 34 BRPM | SYSTOLIC BLOOD PRESSURE: 192 MMHG | HEART RATE: 166 BPM | OXYGEN SATURATION: 97 % | TEMPERATURE: 98 F | HEIGHT: 69 IN | DIASTOLIC BLOOD PRESSURE: 93 MMHG

## 2022-06-14 DIAGNOSIS — A41.9 SEPSIS, UNSPECIFIED ORGANISM: ICD-10-CM

## 2022-06-14 DIAGNOSIS — Z95.5 PRESENCE OF CORONARY ANGIOPLASTY IMPLANT AND GRAFT: Chronic | ICD-10-CM

## 2022-06-14 DIAGNOSIS — J44.0 CHRONIC OBSTRUCTIVE PULMONARY DISEASE WITH (ACUTE) LOWER RESPIRATORY INFECTION: ICD-10-CM

## 2022-06-14 DIAGNOSIS — Z90.49 ACQUIRED ABSENCE OF OTHER SPECIFIED PARTS OF DIGESTIVE TRACT: Chronic | ICD-10-CM

## 2022-06-14 DIAGNOSIS — Z95.5 PRESENCE OF CORONARY ANGIOPLASTY IMPLANT AND GRAFT: ICD-10-CM

## 2022-06-14 DIAGNOSIS — J15.6 PNEUMONIA DUE TO OTHER GRAM-NEGATIVE BACTERIA: ICD-10-CM

## 2022-06-14 DIAGNOSIS — J96.21 ACUTE AND CHRONIC RESPIRATORY FAILURE WITH HYPOXIA: ICD-10-CM

## 2022-06-14 DIAGNOSIS — Z86.12 PERSONAL HISTORY OF POLIOMYELITIS: ICD-10-CM

## 2022-06-14 DIAGNOSIS — I25.10 ATHEROSCLEROTIC HEART DISEASE OF NATIVE CORONARY ARTERY WITHOUT ANGINA PECTORIS: ICD-10-CM

## 2022-06-14 DIAGNOSIS — Z90.2 ACQUIRED ABSENCE OF LUNG [PART OF]: ICD-10-CM

## 2022-06-14 DIAGNOSIS — Z99.81 DEPENDENCE ON SUPPLEMENTAL OXYGEN: ICD-10-CM

## 2022-06-14 DIAGNOSIS — Z98.41 CATARACT EXTRACTION STATUS, RIGHT EYE: ICD-10-CM

## 2022-06-14 DIAGNOSIS — Z90.2 ACQUIRED ABSENCE OF LUNG [PART OF]: Chronic | ICD-10-CM

## 2022-06-14 DIAGNOSIS — I10 ESSENTIAL (PRIMARY) HYPERTENSION: ICD-10-CM

## 2022-06-14 DIAGNOSIS — Z87.820 PERSONAL HISTORY OF TRAUMATIC BRAIN INJURY: ICD-10-CM

## 2022-06-14 DIAGNOSIS — Z79.82 LONG TERM (CURRENT) USE OF ASPIRIN: ICD-10-CM

## 2022-06-14 DIAGNOSIS — Z96.1 PRESENCE OF INTRAOCULAR LENS: ICD-10-CM

## 2022-06-14 DIAGNOSIS — Z98.890 OTHER SPECIFIED POSTPROCEDURAL STATES: Chronic | ICD-10-CM

## 2022-06-14 DIAGNOSIS — R00.0 TACHYCARDIA, UNSPECIFIED: ICD-10-CM

## 2022-06-14 DIAGNOSIS — Z98.890 OTHER SPECIFIED POSTPROCEDURAL STATES: ICD-10-CM

## 2022-06-14 DIAGNOSIS — I25.2 OLD MYOCARDIAL INFARCTION: ICD-10-CM

## 2022-06-14 DIAGNOSIS — Z92.21 PERSONAL HISTORY OF ANTINEOPLASTIC CHEMOTHERAPY: ICD-10-CM

## 2022-06-14 DIAGNOSIS — Z87.81 PERSONAL HISTORY OF (HEALED) TRAUMATIC FRACTURE: ICD-10-CM

## 2022-06-14 DIAGNOSIS — H26.9 UNSPECIFIED CATARACT: Chronic | ICD-10-CM

## 2022-06-14 DIAGNOSIS — Z85.118 PERSONAL HISTORY OF OTHER MALIGNANT NEOPLASM OF BRONCHUS AND LUNG: ICD-10-CM

## 2022-06-14 DIAGNOSIS — J44.1 CHRONIC OBSTRUCTIVE PULMONARY DISEASE WITH (ACUTE) EXACERBATION: ICD-10-CM

## 2022-06-14 DIAGNOSIS — K21.9 GASTRO-ESOPHAGEAL REFLUX DISEASE WITHOUT ESOPHAGITIS: ICD-10-CM

## 2022-06-14 LAB
ALBUMIN SERPL ELPH-MCNC: 4.1 G/DL — SIGNIFICANT CHANGE UP (ref 3.5–5.2)
ALP SERPL-CCNC: 134 U/L — HIGH (ref 30–115)
ALT FLD-CCNC: 9 U/L — SIGNIFICANT CHANGE UP (ref 0–41)
ANION GAP SERPL CALC-SCNC: 13 MMOL/L — SIGNIFICANT CHANGE UP (ref 7–14)
AST SERPL-CCNC: 9 U/L — SIGNIFICANT CHANGE UP (ref 0–41)
BASE EXCESS BLDV CALC-SCNC: 9.1 MMOL/L — HIGH (ref -2–3)
BASOPHILS # BLD AUTO: 0.03 K/UL — SIGNIFICANT CHANGE UP (ref 0–0.2)
BASOPHILS NFR BLD AUTO: 0.2 % — SIGNIFICANT CHANGE UP (ref 0–1)
BILIRUB SERPL-MCNC: 0.6 MG/DL — SIGNIFICANT CHANGE UP (ref 0.2–1.2)
BUN SERPL-MCNC: 28 MG/DL — HIGH (ref 10–20)
CA-I SERPL-SCNC: 1.14 MMOL/L — LOW (ref 1.15–1.33)
CALCIUM SERPL-MCNC: 9.1 MG/DL — SIGNIFICANT CHANGE UP (ref 8.5–10.1)
CHLORIDE SERPL-SCNC: 100 MMOL/L — SIGNIFICANT CHANGE UP (ref 98–110)
CO2 SERPL-SCNC: 31 MMOL/L — SIGNIFICANT CHANGE UP (ref 17–32)
CREAT SERPL-MCNC: 0.8 MG/DL — SIGNIFICANT CHANGE UP (ref 0.7–1.5)
EGFR: 94 ML/MIN/1.73M2 — SIGNIFICANT CHANGE UP
EOSINOPHIL # BLD AUTO: 0.01 K/UL — SIGNIFICANT CHANGE UP (ref 0–0.7)
EOSINOPHIL NFR BLD AUTO: 0.1 % — SIGNIFICANT CHANGE UP (ref 0–8)
GAS PNL BLDV: 138 MMOL/L — SIGNIFICANT CHANGE UP (ref 136–145)
GAS PNL BLDV: SIGNIFICANT CHANGE UP
GLUCOSE SERPL-MCNC: 198 MG/DL — HIGH (ref 70–99)
HCO3 BLDV-SCNC: 36 MMOL/L — HIGH (ref 22–29)
HCT VFR BLD CALC: 48.9 % — SIGNIFICANT CHANGE UP (ref 42–52)
HCT VFR BLDA CALC: 48 % — SIGNIFICANT CHANGE UP (ref 39–51)
HGB BLD CALC-MCNC: 16.1 G/DL — SIGNIFICANT CHANGE UP (ref 12.6–17.4)
HGB BLD-MCNC: 15.4 G/DL — SIGNIFICANT CHANGE UP (ref 14–18)
IMM GRANULOCYTES NFR BLD AUTO: 0.5 % — HIGH (ref 0.1–0.3)
LACTATE BLDV-MCNC: 1.9 MMOL/L — SIGNIFICANT CHANGE UP (ref 0.5–2)
LYMPHOCYTES # BLD AUTO: 1.13 K/UL — LOW (ref 1.2–3.4)
LYMPHOCYTES # BLD AUTO: 6.9 % — LOW (ref 20.5–51.1)
MCHC RBC-ENTMCNC: 28.9 PG — SIGNIFICANT CHANGE UP (ref 27–31)
MCHC RBC-ENTMCNC: 31.5 G/DL — LOW (ref 32–37)
MCV RBC AUTO: 91.9 FL — SIGNIFICANT CHANGE UP (ref 80–94)
MONOCYTES # BLD AUTO: 1.7 K/UL — HIGH (ref 0.1–0.6)
MONOCYTES NFR BLD AUTO: 10.3 % — HIGH (ref 1.7–9.3)
NEUTROPHILS # BLD AUTO: 13.54 K/UL — HIGH (ref 1.4–6.5)
NEUTROPHILS NFR BLD AUTO: 82 % — HIGH (ref 42.2–75.2)
NRBC # BLD: 0 /100 WBCS — SIGNIFICANT CHANGE UP (ref 0–0)
NT-PROBNP SERPL-SCNC: 550 PG/ML — HIGH (ref 0–300)
PCO2 BLDV: 59 MMHG — HIGH (ref 42–55)
PH BLDV: 7.4 — SIGNIFICANT CHANGE UP (ref 7.32–7.43)
PLATELET # BLD AUTO: 255 K/UL — SIGNIFICANT CHANGE UP (ref 130–400)
PO2 BLDV: 59 MMHG — SIGNIFICANT CHANGE UP
POTASSIUM BLDV-SCNC: 3.6 MMOL/L — SIGNIFICANT CHANGE UP (ref 3.5–5.1)
POTASSIUM SERPL-MCNC: 3.7 MMOL/L — SIGNIFICANT CHANGE UP (ref 3.5–5)
POTASSIUM SERPL-SCNC: 3.7 MMOL/L — SIGNIFICANT CHANGE UP (ref 3.5–5)
PROT SERPL-MCNC: 6.8 G/DL — SIGNIFICANT CHANGE UP (ref 6–8)
RAPID RVP RESULT: SIGNIFICANT CHANGE UP
RBC # BLD: 5.32 M/UL — SIGNIFICANT CHANGE UP (ref 4.7–6.1)
RBC # FLD: 13.2 % — SIGNIFICANT CHANGE UP (ref 11.5–14.5)
SAO2 % BLDV: 90.8 % — SIGNIFICANT CHANGE UP
SARS-COV-2 RNA SPEC QL NAA+PROBE: SIGNIFICANT CHANGE UP
SODIUM SERPL-SCNC: 144 MMOL/L — SIGNIFICANT CHANGE UP (ref 135–146)
TROPONIN T SERPL-MCNC: <0.01 NG/ML — SIGNIFICANT CHANGE UP
WBC # BLD: 16.49 K/UL — HIGH (ref 4.8–10.8)
WBC # FLD AUTO: 16.49 K/UL — HIGH (ref 4.8–10.8)

## 2022-06-14 PROCEDURE — 99221 1ST HOSP IP/OBS SF/LOW 40: CPT

## 2022-06-14 PROCEDURE — 99223 1ST HOSP IP/OBS HIGH 75: CPT

## 2022-06-14 PROCEDURE — 99292 CRITICAL CARE ADDL 30 MIN: CPT

## 2022-06-14 PROCEDURE — 93010 ELECTROCARDIOGRAM REPORT: CPT

## 2022-06-14 PROCEDURE — 71250 CT THORAX DX C-: CPT | Mod: 26,MG

## 2022-06-14 PROCEDURE — 71045 X-RAY EXAM CHEST 1 VIEW: CPT | Mod: 26

## 2022-06-14 PROCEDURE — G1004: CPT

## 2022-06-14 PROCEDURE — 99291 CRITICAL CARE FIRST HOUR: CPT

## 2022-06-14 RX ORDER — HEPARIN SODIUM 5000 [USP'U]/ML
5000 INJECTION INTRAVENOUS; SUBCUTANEOUS EVERY 8 HOURS
Refills: 0 | Status: DISCONTINUED | OUTPATIENT
Start: 2022-06-14 | End: 2022-06-18

## 2022-06-14 RX ORDER — BUDESONIDE AND FORMOTEROL FUMARATE DIHYDRATE 160; 4.5 UG/1; UG/1
2 AEROSOL RESPIRATORY (INHALATION)
Refills: 0 | Status: DISCONTINUED | OUTPATIENT
Start: 2022-06-14 | End: 2022-06-18

## 2022-06-14 RX ORDER — AZITHROMYCIN 500 MG/1
500 TABLET, FILM COATED ORAL EVERY 24 HOURS
Refills: 0 | Status: DISCONTINUED | OUTPATIENT
Start: 2022-06-15 | End: 2022-06-18

## 2022-06-14 RX ORDER — INFLUENZA VIRUS VACCINE 15; 15; 15; 15 UG/.5ML; UG/.5ML; UG/.5ML; UG/.5ML
0.7 SUSPENSION INTRAMUSCULAR ONCE
Refills: 0 | Status: DISCONTINUED | OUTPATIENT
Start: 2022-06-14 | End: 2022-06-18

## 2022-06-14 RX ORDER — CEFTRIAXONE 500 MG/1
1000 INJECTION, POWDER, FOR SOLUTION INTRAMUSCULAR; INTRAVENOUS ONCE
Refills: 0 | Status: COMPLETED | OUTPATIENT
Start: 2022-06-14 | End: 2022-06-14

## 2022-06-14 RX ORDER — ACETAMINOPHEN 500 MG
650 TABLET ORAL EVERY 6 HOURS
Refills: 0 | Status: DISCONTINUED | OUTPATIENT
Start: 2022-06-14 | End: 2022-06-18

## 2022-06-14 RX ORDER — AZITHROMYCIN 500 MG/1
500 TABLET, FILM COATED ORAL ONCE
Refills: 0 | Status: COMPLETED | OUTPATIENT
Start: 2022-06-14 | End: 2022-06-14

## 2022-06-14 RX ORDER — AMLODIPINE BESYLATE 2.5 MG/1
1 TABLET ORAL
Qty: 0 | Refills: 0 | DISCHARGE

## 2022-06-14 RX ORDER — METOPROLOL TARTRATE 50 MG
50 TABLET ORAL DAILY
Refills: 0 | Status: DISCONTINUED | OUTPATIENT
Start: 2022-06-14 | End: 2022-06-15

## 2022-06-14 RX ORDER — PANTOPRAZOLE SODIUM 20 MG/1
40 TABLET, DELAYED RELEASE ORAL
Refills: 0 | Status: DISCONTINUED | OUTPATIENT
Start: 2022-06-14 | End: 2022-06-18

## 2022-06-14 RX ORDER — FLUTICASONE FUROATE, UMECLIDINIUM BROMIDE AND VILANTEROL TRIFENATATE 200; 62.5; 25 UG/1; UG/1; UG/1
1 POWDER RESPIRATORY (INHALATION)
Qty: 0 | Refills: 0 | DISCHARGE

## 2022-06-14 RX ORDER — TRAZODONE HCL 50 MG
150 TABLET ORAL AT BEDTIME
Refills: 0 | Status: DISCONTINUED | OUTPATIENT
Start: 2022-06-14 | End: 2022-06-18

## 2022-06-14 RX ORDER — AMLODIPINE BESYLATE 2.5 MG/1
5 TABLET ORAL DAILY
Refills: 0 | Status: DISCONTINUED | OUTPATIENT
Start: 2022-06-14 | End: 2022-06-18

## 2022-06-14 RX ORDER — IPRATROPIUM/ALBUTEROL SULFATE 18-103MCG
3 AEROSOL WITH ADAPTER (GRAM) INHALATION EVERY 6 HOURS
Refills: 0 | Status: DISCONTINUED | OUTPATIENT
Start: 2022-06-14 | End: 2022-06-18

## 2022-06-14 RX ORDER — IPRATROPIUM/ALBUTEROL SULFATE 18-103MCG
3 AEROSOL WITH ADAPTER (GRAM) INHALATION ONCE
Refills: 0 | Status: COMPLETED | OUTPATIENT
Start: 2022-06-14 | End: 2022-06-14

## 2022-06-14 RX ORDER — MAGNESIUM SULFATE 500 MG/ML
2 VIAL (ML) INJECTION ONCE
Refills: 0 | Status: COMPLETED | OUTPATIENT
Start: 2022-06-14 | End: 2022-06-14

## 2022-06-14 RX ORDER — ESOMEPRAZOLE MAGNESIUM 40 MG/1
1 CAPSULE, DELAYED RELEASE ORAL
Qty: 0 | Refills: 0 | DISCHARGE

## 2022-06-14 RX ORDER — TRAZODONE HCL 50 MG
0 TABLET ORAL
Qty: 0 | Refills: 0 | DISCHARGE

## 2022-06-14 RX ORDER — CEFTRIAXONE 500 MG/1
1000 INJECTION, POWDER, FOR SOLUTION INTRAMUSCULAR; INTRAVENOUS EVERY 24 HOURS
Refills: 0 | Status: DISCONTINUED | OUTPATIENT
Start: 2022-06-15 | End: 2022-06-18

## 2022-06-14 RX ADMIN — HEPARIN SODIUM 5000 UNIT(S): 5000 INJECTION INTRAVENOUS; SUBCUTANEOUS at 22:28

## 2022-06-14 RX ADMIN — AZITHROMYCIN 255 MILLIGRAM(S): 500 TABLET, FILM COATED ORAL at 11:48

## 2022-06-14 RX ADMIN — Medication 3 MILLILITER(S): at 11:04

## 2022-06-14 RX ADMIN — Medication 150 MILLIGRAM(S): at 22:26

## 2022-06-14 RX ADMIN — Medication 3 MILLILITER(S): at 20:23

## 2022-06-14 RX ADMIN — CEFTRIAXONE 100 MILLIGRAM(S): 500 INJECTION, POWDER, FOR SOLUTION INTRAMUSCULAR; INTRAVENOUS at 11:48

## 2022-06-14 RX ADMIN — Medication 150 GRAM(S): at 10:08

## 2022-06-14 NOTE — CONSULT NOTE ADULT - NS_MD_PANP_GEN_ALL_CORE
Detail Level: Detailed Duration Of Freeze Thaw-Cycle (Seconds): 0 Attending and PA/NP shared services statement (NON-critical care): Post-Care Instructions: I reviewed with the patient in detail post-care instructions. Patient is to wear sunprotection, and avoid picking at any of the treated lesions. Pt may apply Vaseline to crusted or scabbing areas. Total Number Of Aks Treated: 2 Render In Bullet Format When Appropriate: No Consent: The patient's consent was obtained including but not limited to risks of crusting, scabbing, blistering, scarring, darker or lighter pigmentary change, recurrence, incomplete removal and infection. Number Of Freeze-Thaw Cycles: 1 freeze-thaw cycle

## 2022-06-14 NOTE — CONSULT NOTE ADULT - SUBJECTIVE AND OBJECTIVE BOX
HPI:  72 year old male with PMHx of lung cancer (s/p right lobectomy and chemo in 2016), COPD (on 3L home O2), active smoker, HTN, CAD s/p cardiac stent, polio, GERD, presents to ED c/p SOB x4 days. Pt reports worsening shortness of breath, not alleviated by home inhalers, worse with ambulation. He was recently treated with a 10 day course of prednisone. Pt does report chronic dry cough. He denies fever, chills, productive cough, chest pain, urinary complaints, leg edema. Pt denies sick contacts.   Pt is also currently being worked up for arrhythmia and has Holter monitor placed over a week ago. Reportedly he got a call from cardiologist this morning stating episodes of arrhyhtmia were noted on monitor.  In the ED pt afebrile, tachycardic to 166, tachypneic, was placed on BIPAP with improvement. CXR demonstrated new predominantly right middle lobe airspace consolidation with additional bilateral centrilobular nodular opacities and medial right lower lobe patchy opacity.  Pt received Ceftiaxone/Azithro, Duoneb x2 and Mg 2g.   (14 Jun 2022 14:04)        HPI-Cardiology   Pt with above Hx, evaluated at bedside. Currently admitted for evaluation of SOB. Radiology tests and hospital records, were reviewed, as well as previous notes on this patient. Pt currently......      PAST MEDICAL & SURGICAL HISTORY  Coronary artery disease    Myocardial infarction  x 2    Polio    Lung cancer  RIGHT LUNG, UPPER LOBECTOMY 2015    Brain concussion  October, 2017    Chronic GERD    History of lobectomy of lung  UPPER, RIGHT    H/O left knee surgery  TO CORRECT POLIO    History of tonsillectomy and adenoidectomy  CHILDHOOD    S/P appendectomy  CHILDHOOD    H/O colonoscopy  2013    Cataract, right eye  with IOL placement, Oct. 2018    H/O heart artery stent    History of coronary artery stent placement        FAMILY HISTORY:  FAMILY HISTORY:  Family history of MI (myocardial infarction) (Grandparent)  at 61 yo        SOCIAL HISTORY:  []smoker  []Alcohol  []Drug    ALLERGIES:  No Known Allergies      MEDICATIONS:  MEDICATIONS  (STANDING):  albuterol/ipratropium for Nebulization 3 milliLiter(s) Nebulizer every 6 hours  amLODIPine   Tablet 5 milliGRAM(s) Oral daily  budesonide 160 MICROgram(s)/formoterol 4.5 MICROgram(s) Inhaler 2 Puff(s) Inhalation two times a day  heparin   Injectable 5000 Unit(s) SubCutaneous every 8 hours  metoprolol succinate ER 50 milliGRAM(s) Oral daily  naproxen 500 milliGRAM(s) Oral two times a day  pantoprazole    Tablet 40 milliGRAM(s) Oral before breakfast  predniSONE   Tablet 20 milliGRAM(s) Oral daily  traZODone 150 milliGRAM(s) Oral at bedtime    MEDICATIONS  (PRN):  acetaminophen     Tablet .. 650 milliGRAM(s) Oral every 6 hours PRN Temp greater or equal to 38C (100.4F), Mild Pain (1 - 3)      HOME MEDICATIONS:  Home Medications:  amLODIPine 5 mg oral tablet: 1 tab(s) orally once a day (14 Jun 2022 14:52)  Metoprolol Succinate ER 50 mg oral tablet, extended release: 1 tab(s) orally once a day (14 Jun 2022 14:52)  NexIUM 24HR 20 mg oral delayed release capsule: 1 cap(s) orally once a day (14 Jun 2022 14:52)  piroxicam 20 mg oral capsule: 1 cap(s) orally once a day (14 Jun 2022 14:52)  traZODone 150 mg oral tablet: orally once a day (14 Jun 2022 14:52)  Trelegy Ellipta 100 mcg-62.5 mcg-25 mcg/inh inhalation powder: 1 puff(s) inhaled once a day (14 Jun 2022 14:52)      VITALS:   T(F): 97.6 (06-14 @ 09:48), Max: 97.6 (06-14 @ 09:48)  HR: 96 (06-14 @ 16:34) (96 - 166)  BP: 142/75 (06-14 @ 16:34) (133/80 - 192/93)  BP(mean): --  RR: 16 (06-14 @ 16:34) (16 - 34)  SpO2: 98% (06-14 @ 16:34) (96% - 98%)    I&O's Summary      REVIEW OF SYSTEMS:  CONSTITUTIONAL: No weakness, fevers or chills  EYES: No visual changes  ENT: No vertigo or throat pain   NECK: No pain or stiffness  RESPIRATORY: No cough, wheezing, hemoptysis; No shortness of breath  CARDIOVASCULAR: No chest pain or palpitations  GASTROINTESTINAL: No abdominal or epigastric pain. No nausea, vomiting, or hematemesis; No diarrhea or constipation. No melena or hematochezia.  GENITOURINARY: No dysuria, frequency or hematuria  NEUROLOGICAL: No numbness or weakness  SKIN: No itching, no rashes  MSK: no    PHYSICAL EXAM:  NEURO: patient is awake , alert and oriented  GEN: Not in acute distress  NECK: no thyroid enlargement, no JVD  LUNGS: Clear to auscultation bilaterally   CARDIOVASCULAR: S1/S2 present, RRR , no murmurs or rubs, no carotid bruits,  + PP bilaterally  ABD: Soft, non-tender, non-distended, +BS  EXT: No HENRIQUE  SKIN: Intact    LABS:                        15.4   16.49 )-----------( 255      ( 14 Jun 2022 10:00 )             48.9     06-14    144  |  100  |  28<H>  ----------------------------<  198<H>  3.7   |  31  |  0.8    Ca    9.1      14 Jun 2022 10:00    TPro  6.8  /  Alb  4.1  /  TBili  0.6  /  DBili  x   /  AST  9   /  ALT  9   /  AlkPhos  134<H>  06-14      Troponin T, Serum: <0.01 ng/mL (06-14-22 @ 10:00)    CARDIAC MARKERS ( 14 Jun 2022 10:00 )  x     / <0.01 ng/mL / x     / x     / x            Troponin trend:    Serum Pro-Brain Natriuretic Peptide: 550 pg/mL (06-14-22 @ 10:00)          RADIOLOGY:  -CXR:  -TTE:  -CCTA:  -STRESS TEST:  -CATHETERIZATION:    ECG:    TELEMETRY EVENTS:   HPI:  72 year old male with PMHx of lung cancer (s/p right lobectomy and chemo in 2016), COPD (on 3L home O2), active smoker, HTN, CAD s/p cardiac stent, polio, GERD, presents to ED c/p SOB x4 days. Pt reports worsening shortness of breath, not alleviated by home inhalers, worse with ambulation. He was recently treated with a 10 day course of prednisone. Pt does report chronic dry cough. He denies fever, chills, productive cough, chest pain, urinary complaints, leg edema. Pt denies sick contacts.   Pt is also currently being worked up for arrhythmia and has Holter monitor placed over a week ago. Reportedly he got a call from cardiologist this morning stating episodes of arrhyhtmia were noted on monitor.  In the ED pt afebrile, tachycardic to 166, tachypneic, was placed on BIPAP with improvement. CXR demonstrated new predominantly right middle lobe airspace consolidation with additional bilateral centrilobular nodular opacities and medial right lower lobe patchy opacity.  Pt received Ceftiaxone/Azithro, Duoneb x2 and Mg 2g.   (14 Jun 2022 14:04)        HPI-Cardiology   Pt with above Hx, evaluated at bedside. Currently admitted for evaluation of SOB. Pt p[resented with progressively worsening SOB for one week. States that he has been taking his inhalers, which haven't been helping. Pt reports that the SOB is worse with ambulation. States that he has an MCOT monitor, and is being worked up by Dr Aguilar for possible arrhythmia. Pt states that he was told there was an episode of arrhythmia on his MCOT and should go to the ED. Currently denies CP, palpitations, dizziness/lightheadedness, diaphoresis, nausea/vomiting or b/l le edema. Radiology tests and hospital records, were reviewed, as well as previous notes on this patient.       PAST MEDICAL & SURGICAL HISTORY  Coronary artery disease    Myocardial infarction  x 2    Polio    Lung cancer  RIGHT LUNG, UPPER LOBECTOMY 2015    Brain concussion  October, 2017    Chronic GERD    History of lobectomy of lung  UPPER, RIGHT    H/O left knee surgery  TO CORRECT POLIO    History of tonsillectomy and adenoidectomy  CHILDHOOD    S/P appendectomy  CHILDHOOD    H/O colonoscopy  2013    Cataract, right eye  with IOL placement, Oct. 2018    H/O heart artery stent    History of coronary artery stent placement        FAMILY HISTORY:  FAMILY HISTORY:  Family history of MI (myocardial infarction) (Grandparent)  at 61 yo        ALLERGIES:  No Known Allergies      MEDICATIONS:  MEDICATIONS  (STANDING):  albuterol/ipratropium for Nebulization 3 milliLiter(s) Nebulizer every 6 hours  amLODIPine   Tablet 5 milliGRAM(s) Oral daily  budesonide 160 MICROgram(s)/formoterol 4.5 MICROgram(s) Inhaler 2 Puff(s) Inhalation two times a day  heparin   Injectable 5000 Unit(s) SubCutaneous every 8 hours  metoprolol succinate ER 50 milliGRAM(s) Oral daily  naproxen 500 milliGRAM(s) Oral two times a day  pantoprazole    Tablet 40 milliGRAM(s) Oral before breakfast  predniSONE   Tablet 20 milliGRAM(s) Oral daily  traZODone 150 milliGRAM(s) Oral at bedtime    MEDICATIONS  (PRN):  acetaminophen     Tablet .. 650 milliGRAM(s) Oral every 6 hours PRN Temp greater or equal to 38C (100.4F), Mild Pain (1 - 3)      HOME MEDICATIONS:  Home Medications:  amLODIPine 5 mg oral tablet: 1 tab(s) orally once a day (14 Jun 2022 14:52)  Metoprolol Succinate ER 50 mg oral tablet, extended release: 1 tab(s) orally once a day (14 Jun 2022 14:52)  NexIUM 24HR 20 mg oral delayed release capsule: 1 cap(s) orally once a day (14 Jun 2022 14:52)  piroxicam 20 mg oral capsule: 1 cap(s) orally once a day (14 Jun 2022 14:52)  traZODone 150 mg oral tablet: orally once a day (14 Jun 2022 14:52)  Trelegy Ellipta 100 mcg-62.5 mcg-25 mcg/inh inhalation powder: 1 puff(s) inhaled once a day (14 Jun 2022 14:52)      VITALS:   T(F): 97.6 (06-14 @ 09:48), Max: 97.6 (06-14 @ 09:48)  HR: 96 (06-14 @ 16:34) (96 - 166)  BP: 142/75 (06-14 @ 16:34) (133/80 - 192/93)  BP(mean): --  RR: 16 (06-14 @ 16:34) (16 - 34)  SpO2: 98% (06-14 @ 16:34) (96% - 98%)          REVIEW OF SYSTEMS:  See HPI        PHYSICAL EXAM:  NEURO: patient is awake , alert and oriented  GEN: Not in acute distress  NECK: no thyroid enlargement, no JVD  LUNGS: wheezing noted B/L on ascultation, with decreased breath sounds on right side  CARDIOVASCULAR: S1/S2 present, RRR , no murmurs or rubs, no carotid bruits,  + PP bilaterally  ABD: Soft, non-tender, non-distended, +BS  EXT: No HENRIQUE  SKIN: Intact    LABS:                        15.4   16.49 )-----------( 255      ( 14 Jun 2022 10:00 )             48.9     06-14    144  |  100  |  28<H>  ----------------------------<  198<H>  3.7   |  31  |  0.8    Ca    9.1      14 Jun 2022 10:00    TPro  6.8  /  Alb  4.1  /  TBili  0.6  /  DBili  x   /  AST  9   /  ALT  9   /  AlkPhos  134<H>  06-14      Troponin T, Serum: <0.01 ng/mL (06-14-22 @ 10:00)    CARDIAC MARKERS ( 14 Jun 2022 10:00 )  x     / <0.01 ng/mL / x     / x     / x            Serum Pro-Brain Natriuretic Peptide: 550 pg/mL (06-14-22 @ 10:00)          RADIOLOGY:  -CXR:  < from: Xray Chest 1 View-PORTABLE IMMEDIATE (06.14.22 @ 10:59) >  Impression:    Possible new right apical pneumothorax with airgap 1.3 cm. Further   evaluation with noncontrast chest CT is recommended    Increasing right lung opacities.    Dr. Barrios was made aware of the above findings on 6/14/2022 at 11:13   AM with read back    --- End of Report ---      < end of copied text >            < from: CT Chest No Cont (06.14.22 @ 12:01) >  IMPRESSION:  Since May 17, 2022:    New predominantly right middle lobe airspace consolidation with   additional bilateral centrilobular nodular opacities and medial right   lower lobe patchy opacity. Findings compatible with pneumonia in the   appropriate clinical setting.    Status post right upper lobectomy.    No pneumothorax.    --- End of Report ---    < end of copied text >      ECG:  < from: 12 Lead ECG (06.14.22 @ 10:05) >   Sinus tachycardia with occasional Premature ventricular complexes  Right bundle branch block  Left posterior fascicular block  *** Bifascicular block ***  Abnormal ECG    Confirmed by VANCE AGUILAR MD ([id number]) on 6/14/2022 10:57:15 AM    < end of copied text >

## 2022-06-14 NOTE — H&P ADULT - NSHPLABSRESULTS_GEN_ALL_CORE
15.4   16.49 )-----------( 255      ( 14 Jun 2022 10:00 )             48.9       06-14    144  |  100  |  28<H>  ----------------------------<  198<H>  3.7   |  31  |  0.8    Ca    9.1      14 Jun 2022 10:00    TPro  6.8  /  Alb  4.1  /  TBili  0.6  /  DBili  x   /  AST  9   /  ALT  9   /  AlkPhos  134<H>  06-14         CARDIAC MARKERS ( 14 Jun 2022 10:00 )  x     / <0.01 ng/mL / x     / x     / x        RADIOLOGY:  Xray Chest 1 View- PORTABLE-Urgent (05.17.22 @ 16:36)   Impression:  Right lung opacities -see same day chest CT for detailed evaluation.      REBECA JADE MD; Attending Radiologist  This document has been electronically signed. May 18 2022  8:51AM      CT Chest No Cont (06.14.22 @ 12:01)   IMPRESSION:  Since May 17, 2022:    New predominantly right middle lobe airspace consolidation with   additional bilateral centrilobular nodular opacities and medial right   lower lobe patchy opacity. Findings compatible with pneumonia in the   appropriate clinical setting.  Status post right upper lobectomy.  No pneumothorax.      JOSELIN TOLEDO MD; Attending Radiologist

## 2022-06-14 NOTE — ED PROVIDER NOTE - PHYSICAL EXAMINATION
CONSTITUTIONAL: thin  SKIN: warm, dry  HEAD: Normocephalic  EYES: no conjunctival injection  ENT: No nasal discharge; airway clear.  CARD: S1, S2 normal; Regular rhythm. +tachycardia  RESP: +end expiratory wheezing with diminished breath sounds b/l. tachypneic with accessory muscle use.  ABD: soft ntnd  EXT: Normal ROM.  No clubbing, cyanosis or edema.   NEURO: Alert, oriented, grossly unremarkable.  PSYCH: Cooperative, appropriate. CONSTITUTIONAL: thin  SKIN: warm, dry  HEAD: Normocephalic  EYES: no conjunctival injection  ENT: No nasal discharge; airway clear.  CARD: S1, S2 normal; Irregular rhythm. +tachycardia  RESP: +end expiratory wheezing with diminished breath sounds b/l. tachypneic with accessory muscle use.  ABD: soft ntnd  EXT: Normal ROM.  No clubbing, cyanosis or edema.   NEURO: Alert, oriented, grossly unremarkable.  PSYCH: Cooperative, appropriate.

## 2022-06-14 NOTE — ED PROVIDER NOTE - ATTENDING CONTRIBUTION TO CARE
I was present for and supervised the key and critical aspects of the procedures performed during the care of the patient. Patient is a 72-year-old male history of COPD history of lung cancer status post right lobectomy several years prior presents with worsening shortness of breath over the past 4 days not alleviated by his usual medications patient is on 3 L of O2 when needed denies any associated chest pain diaphoresis fevers or chills he does note nonproductive cough    On physical exam patient is in respiratory distress in a tripod position hypoxic despite on nonrebreather upon initial evaluation in addition normocephalic/atraumatic pupils equally round reactive light accommodation extraocular muscles intact patient has diffuse wheezing increased work of breathing accessory muscle use noted sinus tachycardia noted radial pulse 2+ pedal pulse 2+ no edema noted abdomen soft nontender nondistended    Assessment plan patient placed on BiPAP given Solu-Medrol given multiple DuoNeb treatments upon reevaluation after approximately 40 minutes the patient is made significant improvement respiratory distress is near about resolved given IV antibiotics I will admit for further monitoring at this time

## 2022-06-14 NOTE — CONSULT NOTE ADULT - NS ATTEND AMEND GEN_ALL_CORE FT
Patient quit smoking recently. He has COPD. He had arrythmia. Appears atrial tach, this is secondary to COPD possible pneuumonia. Need RX lungs. Patient aware no smoking. Continue beta. When stable ambulate

## 2022-06-14 NOTE — H&P ADULT - HISTORY OF PRESENT ILLNESS
72 year old male with PMHx of lung cancer (s/p right lobectomy and chemo in 2016), COPD (on 3L home O2), active smoker, HTN, CAD s/p cardiac stent, polio, GERD, presents to ED c/p SOB x4 days. Pt reports worsening shortness of breath, not alleviated by home inhalers, worse with ambulation. Pt does report chronic dry cough. He denies fever, chills, productive cough, chest pain, urinary complaints, leg edema. Pt denies sick contacts.   Pt is also currently being worked up for arrhythmia and has Holter monitor placed over a week ago.  In the ED pt afebrile,  tachycardic to 166, tachypneic, was placed on BIPAP with improvement. WBC 16  CXR demonstrated wew predominantly right middle lobe airspace consolidation with additional bilateral centrilobular nodular opacities and medial right lower lobe patchy opacity.    Pt received Ceftiaxone/Azithro, Duoneb x2 and Mg 2g.   72 year old male with PMHx of lung cancer (s/p right lobectomy and chemo in 2016), COPD (on 3L home O2), active smoker, HTN, CAD s/p cardiac stent, polio, GERD, presents to ED c/p SOB x4 days. Pt reports worsening shortness of breath, not alleviated by home inhalers, worse with ambulation. He was recently treated with a 10 day course of prednisone. Pt does report chronic dry cough. He denies fever, chills, productive cough, chest pain, urinary complaints, leg edema. Pt denies sick contacts.   Pt is also currently being worked up for arrhythmia and has Holter monitor placed over a week ago. Reportedly he got a call from cardiologist this morning stating episodes of arrhyhtmia were noted on monitor.  In the ED pt afebrile, tachycardic to 166, tachypneic, was placed on BIPAP with improvement. CXR demonstrated new predominantly right middle lobe airspace consolidation with additional bilateral centrilobular nodular opacities and medial right lower lobe patchy opacity.  Pt received Ceftiaxone/Azithro, Duoneb x2 and Mg 2g.

## 2022-06-14 NOTE — ED PROVIDER NOTE - CLINICAL SUMMARY MEDICAL DECISION MAKING FREE TEXT BOX
patient placed on BiPAP given Solu-Medrol given multiple DuoNeb treatments upon reevaluation after approximately 40 minutes the patient is made significant improvement respiratory distress is near about resolved given IV antibiotics I will admit for further monitoring at this time  patient continued to improve in the ed, at this time, resp distress resolved, patient is asking to eat.  at this time patient can be admitted to floor based on level of improvement

## 2022-06-14 NOTE — H&P ADULT - NSHPPHYSICALEXAM_GEN_ALL_CORE
VITALS:   T(C): 36.4 (06-14-22 @ 09:48), Max: 36.4 (06-14-22 @ 09:48)  HR: 110 (06-14-22 @ 12:41) (110 - 166)  BP: 146/66 (06-14-22 @ 12:41) (133/80 - 192/93)  RR: 20 (06-14-22 @ 12:41) (20 - 34)  SpO2: 97% (06-14-22 @ 12:41) (96% - 97%)    GENERAL: NAD, lying in bed comfortably, on bipap  HEAD:  Atraumatic, Normocephalic  EYES: EOMI  ENT: Moist mucous membranes  NECK: Supple  CHEST/LUNG: + bilateral expiratory wheezing, no rubs  HEART: Regular rate and rhythm, no rubs, or gallops  ABDOMEN: Bowel sounds present; Soft, Nontender, Nondistended  EXTREMITIES: No LE edema b/l  NERVOUS SYSTEM:  Alert & Oriented X3, speech clear. No gross deficits   SKIN: No rashes or lesions

## 2022-06-14 NOTE — ED PROVIDER NOTE - NS ED ROS FT
GEN:  no fever, no chills, no generalized weakness  NEURO:  no headache, no dizziness  ENT: no sore throat, no runny nose  CV:  no chest pain, +tachycardia  RESP:  +sob, no cough  GI:  no nausea, no vomiting, no abdominal pain, no diarrhea  :  no dysuria, no urinary frequency, no hematuria  MSK:  no joint pain, no edema  SKIN:  no rash, no bruising

## 2022-06-14 NOTE — H&P ADULT - ASSESSMENT
72 year old male with PMHx of lung cancer (s/p right lobectomy and chemo in 2016), COPD (on 3L home O2), active smoker, HTN, CAD s/p cardiac stent, polio, GERD, presents to ED c/p SOB x4 days.    # SOB likely 2/2 CAP  - s/p Ceftriaxone/ Azithro, will continue  - ID consult  - currently on BIPAP, wean off when possible  - duoneb  - obtain procalc  - check urine for legionella, strep pna  - AM labs    # Hx COPD on 3L NC  # Hx lung CA  - continue home inhalers  - supplemental oxygen    # Hx Smoking  - quit 3 days ago  - encouraged nicotine cessation    # CAD s/p stent  # HTN  - not on ASA/plavix  - continue Metoprolol, Amlodipine     # GERG  - continue PPI         72 year old male with PMHx of lung cancer (s/p right lobectomy and chemo in 2016), COPD (on 3L home O2), active smoker, HTN, CAD s/p cardiac stent, polio, GERD, presents to ED c/p SOB x4 days.    # SOB likely 2/2 CAP  # Hx COPD on 3L NC and Lung CA  - s/p Ceftriaxone/ Azithro, will continue  - ID consult  - Pulmonary consult  - start Prednisone 20 mg po qd  - currently on BIPAP, wean off when possible  - duoneb  - obtain procalc  - check urine for legionella, strep pna  - AM labs    # Hx Smoking  - quit 3 days ago  - encouraged nicotine cessation    # Cardiac Arrhythmia  - tele monitoring  - continue Metroprolol     # CAD s/p stent  # HTN  - not on ASA/plavix  - continue Metoprolol, Amlodipine     # GERG  - continue PPI    DVT/GI ppx     72 year old male with PMHx of lung cancer (s/p right lobectomy and chemo in 2016), COPD (on 3L home O2), active smoker, HTN, CAD s/p cardiac stent, polio, GERD, presents to ED c/p SOB x4 days.    # SOB likely suspect COPD exacerbation 2/2 CAP  # Hx COPD on 3L NC and Lung CA s/p loectomy  - s/p Ceftriaxone/ Azithro, will continue  - ID consult  - Pulmonary consult  - start Prednisone 20 mg po qd  - currently on BIPAP, wean off when possible  - duoneb  - obtain procalc  - check urine for legionella, strep pna  - AM labs    # Hx Smoking  - quit 3 days ago  - encouraged nicotine cessation    # Cardiac Arrhythmia  - tele monitoring  - continue Metroprolol     # CAD s/p stent  # HTN  - not on ASA/plavix  - continue Metoprolol, Amlodipine     # GERD  - continue PPI    DVT/GI ppx     72 year old male with PMHx of lung cancer (s/p right lobectomy and chemo in 2016), COPD (on 3L home O2), active smoker, HTN, CAD s/p cardiac stent, polio, GERD, presents to ED c/p SOB x4 days.    # SOB likely suspect COPD exacerbation 2/2 CAP  # Hx COPD on 3L NC and Lung CA s/p lobectomy  - s/p Ceftriaxone/ Azithro, will continue  - ID consult  - Pulmonary consult  - start Prednisone 20 mg po qd  - currently on BIPAP, wean off when possible  - duoneb  - obtain procalc  - check urine for legionella, strep pna  - AM labs    # Hx Smoking  - quit 3 days ago  - encouraged nicotine cessation    # Cardiac Arrhythmia  - tele monitoring  - continue Metroprolol   - cardiology consult    # CAD s/p stent  # HTN  - not on ASA/plavix  - continue Metoprolol, Amlodipine     # GERD  - continue PPI    DVT/GI ppx

## 2022-06-14 NOTE — PATIENT PROFILE ADULT - FALL HARM RISK - HARM RISK INTERVENTIONS

## 2022-06-14 NOTE — ED ADULT NURSE NOTE - CHIEF COMPLAINT QUOTE
BIBA from home as per EMS pt c/o shortness of breath x4 days, pt hx of COPD. EMS gave 3 combivent treatments and 12 mg im of decadron

## 2022-06-14 NOTE — H&P ADULT - NS ATTEND AMEND GEN_ALL_CORE FT
Seen and evaluated. d/w PA and documentation amended. Seen and evaluated. d/w PA and documentation amended.    cosigned for 6/14/2022

## 2022-06-14 NOTE — ED PROVIDER NOTE - OBJECTIVE STATEMENT
73 yo male, PMHx of former smoker, previous hx of lung cancer with right lobectomy, COPD, HTN, cardiac stent, polio, presents with worsening shortness of breath x4 days, not alleviated by Spiriva, associated with intermittent tachycardia noted on his pulse ox. He is on 3 L O2 as needed at home. Denies fevers, chills, cough, chest pain, nausea, vomiting, dizziness, sick contacts, leg swelling.

## 2022-06-14 NOTE — CONSULT NOTE ADULT - ASSESSMENT
72 year old male with PMHx of lung cancer (s/p right lobectomy and chemo in 2016), COPD (on 3L home O2), active smoker, HTN, CAD s/p cardiac stent, polio, GERD, presented to ED c/p SOB x1 week. Found to be in tachyrhythmia on the ED, with 's    Impression:  #Tachyarrhythmia   #SOB: COPD exacerbation 2/2 to CAP?      -Pt on BiPAP, not in acute distress  -ECG: Sinus tachy, with PVC's. RBBB  -On monitor appears in sinus and HR 90's  -. Unknown baseline  -Trop flat x1  -Cxr: Possible new right apical pneumothorax with airgap 1.3 cm  -CT chest: RML PNA  -Report from Pt's MCOT showed arrhythmia, which could be sinus w/PAC's or atrial tachy    Plan:  -Daily ECG's  -No need for AC as rhythm appears sinus  -C/w Metoprolol and Amlodipine   -2D echo  -Check TSH, A1C  -F/u with pulmonary reccs  -Wean off BiPAP as tolerated  -F/u wit ID reccs  -Monitor lytes    Discussed with Dr Singletary

## 2022-06-15 DIAGNOSIS — J18.9 PNEUMONIA, UNSPECIFIED ORGANISM: ICD-10-CM

## 2022-06-15 DIAGNOSIS — J44.1 CHRONIC OBSTRUCTIVE PULMONARY DISEASE WITH (ACUTE) EXACERBATION: ICD-10-CM

## 2022-06-15 LAB
ALBUMIN SERPL ELPH-MCNC: 3.5 G/DL — SIGNIFICANT CHANGE UP (ref 3.5–5.2)
ALP SERPL-CCNC: 124 U/L — HIGH (ref 30–115)
ALT FLD-CCNC: 9 U/L — SIGNIFICANT CHANGE UP (ref 0–41)
ANION GAP SERPL CALC-SCNC: 11 MMOL/L — SIGNIFICANT CHANGE UP (ref 7–14)
AST SERPL-CCNC: 13 U/L — SIGNIFICANT CHANGE UP (ref 0–41)
BILIRUB SERPL-MCNC: 0.2 MG/DL — SIGNIFICANT CHANGE UP (ref 0.2–1.2)
BUN SERPL-MCNC: 46 MG/DL — HIGH (ref 10–20)
CALCIUM SERPL-MCNC: 8.8 MG/DL — SIGNIFICANT CHANGE UP (ref 8.5–10.1)
CHLORIDE SERPL-SCNC: 101 MMOL/L — SIGNIFICANT CHANGE UP (ref 98–110)
CO2 SERPL-SCNC: 31 MMOL/L — SIGNIFICANT CHANGE UP (ref 17–32)
CREAT SERPL-MCNC: 0.8 MG/DL — SIGNIFICANT CHANGE UP (ref 0.7–1.5)
EGFR: 94 ML/MIN/1.73M2 — SIGNIFICANT CHANGE UP
GLUCOSE SERPL-MCNC: 118 MG/DL — HIGH (ref 70–99)
HCT VFR BLD CALC: 44 % — SIGNIFICANT CHANGE UP (ref 42–52)
HGB BLD-MCNC: 13.9 G/DL — LOW (ref 14–18)
MCHC RBC-ENTMCNC: 29.8 PG — SIGNIFICANT CHANGE UP (ref 27–31)
MCHC RBC-ENTMCNC: 31.6 G/DL — LOW (ref 32–37)
MCV RBC AUTO: 94.4 FL — HIGH (ref 80–94)
MRSA PCR RESULT.: NEGATIVE — SIGNIFICANT CHANGE UP
NRBC # BLD: 0 /100 WBCS — SIGNIFICANT CHANGE UP (ref 0–0)
PLATELET # BLD AUTO: 247 K/UL — SIGNIFICANT CHANGE UP (ref 130–400)
POTASSIUM SERPL-MCNC: 4.4 MMOL/L — SIGNIFICANT CHANGE UP (ref 3.5–5)
POTASSIUM SERPL-SCNC: 4.4 MMOL/L — SIGNIFICANT CHANGE UP (ref 3.5–5)
PROCALCITONIN SERPL-MCNC: 0.15 NG/ML — HIGH (ref 0.02–0.1)
PROT SERPL-MCNC: 5.9 G/DL — LOW (ref 6–8)
RBC # BLD: 4.66 M/UL — LOW (ref 4.7–6.1)
RBC # FLD: 13.5 % — SIGNIFICANT CHANGE UP (ref 11.5–14.5)
SODIUM SERPL-SCNC: 143 MMOL/L — SIGNIFICANT CHANGE UP (ref 135–146)
TROPONIN T SERPL-MCNC: <0.01 NG/ML — SIGNIFICANT CHANGE UP
TROPONIN T SERPL-MCNC: <0.01 NG/ML — SIGNIFICANT CHANGE UP
WBC # BLD: 9.65 K/UL — SIGNIFICANT CHANGE UP (ref 4.8–10.8)
WBC # FLD AUTO: 9.65 K/UL — SIGNIFICANT CHANGE UP (ref 4.8–10.8)

## 2022-06-15 PROCEDURE — 99232 SBSQ HOSP IP/OBS MODERATE 35: CPT

## 2022-06-15 PROCEDURE — 99233 SBSQ HOSP IP/OBS HIGH 50: CPT

## 2022-06-15 RX ORDER — POLYETHYLENE GLYCOL 3350 17 G/17G
17 POWDER, FOR SOLUTION ORAL ONCE
Refills: 0 | Status: COMPLETED | OUTPATIENT
Start: 2022-06-15 | End: 2022-06-15

## 2022-06-15 RX ORDER — METOPROLOL TARTRATE 50 MG
25 TABLET ORAL ONCE
Refills: 0 | Status: COMPLETED | OUTPATIENT
Start: 2022-06-15 | End: 2022-06-15

## 2022-06-15 RX ORDER — SENNA PLUS 8.6 MG/1
2 TABLET ORAL AT BEDTIME
Refills: 0 | Status: DISCONTINUED | OUTPATIENT
Start: 2022-06-15 | End: 2022-06-18

## 2022-06-15 RX ORDER — METOPROLOL TARTRATE 50 MG
75 TABLET ORAL DAILY
Refills: 0 | Status: DISCONTINUED | OUTPATIENT
Start: 2022-06-16 | End: 2022-06-18

## 2022-06-15 RX ADMIN — Medication 500 MILLIGRAM(S): at 05:10

## 2022-06-15 RX ADMIN — SENNA PLUS 2 TABLET(S): 8.6 TABLET ORAL at 22:07

## 2022-06-15 RX ADMIN — POLYETHYLENE GLYCOL 3350 17 GRAM(S): 17 POWDER, FOR SOLUTION ORAL at 22:07

## 2022-06-15 RX ADMIN — BUDESONIDE AND FORMOTEROL FUMARATE DIHYDRATE 2 PUFF(S): 160; 4.5 AEROSOL RESPIRATORY (INHALATION) at 08:31

## 2022-06-15 RX ADMIN — Medication 500 MILLIGRAM(S): at 17:29

## 2022-06-15 RX ADMIN — AZITHROMYCIN 255 MILLIGRAM(S): 500 TABLET, FILM COATED ORAL at 13:45

## 2022-06-15 RX ADMIN — BUDESONIDE AND FORMOTEROL FUMARATE DIHYDRATE 2 PUFF(S): 160; 4.5 AEROSOL RESPIRATORY (INHALATION) at 21:24

## 2022-06-15 RX ADMIN — Medication 3 MILLILITER(S): at 08:00

## 2022-06-15 RX ADMIN — HEPARIN SODIUM 5000 UNIT(S): 5000 INJECTION INTRAVENOUS; SUBCUTANEOUS at 05:08

## 2022-06-15 RX ADMIN — PANTOPRAZOLE SODIUM 40 MILLIGRAM(S): 20 TABLET, DELAYED RELEASE ORAL at 05:21

## 2022-06-15 RX ADMIN — Medication 60 MILLIGRAM(S): at 17:30

## 2022-06-15 RX ADMIN — Medication 3 MILLILITER(S): at 13:27

## 2022-06-15 RX ADMIN — Medication 20 MILLIGRAM(S): at 05:09

## 2022-06-15 RX ADMIN — Medication 150 MILLIGRAM(S): at 21:21

## 2022-06-15 RX ADMIN — Medication 25 MILLIGRAM(S): at 11:36

## 2022-06-15 RX ADMIN — AMLODIPINE BESYLATE 5 MILLIGRAM(S): 2.5 TABLET ORAL at 05:09

## 2022-06-15 RX ADMIN — HEPARIN SODIUM 5000 UNIT(S): 5000 INJECTION INTRAVENOUS; SUBCUTANEOUS at 21:24

## 2022-06-15 RX ADMIN — CEFTRIAXONE 100 MILLIGRAM(S): 500 INJECTION, POWDER, FOR SOLUTION INTRAMUSCULAR; INTRAVENOUS at 13:39

## 2022-06-15 RX ADMIN — Medication 50 MILLIGRAM(S): at 05:10

## 2022-06-15 RX ADMIN — HEPARIN SODIUM 5000 UNIT(S): 5000 INJECTION INTRAVENOUS; SUBCUTANEOUS at 17:00

## 2022-06-15 RX ADMIN — Medication 3 MILLILITER(S): at 22:14

## 2022-06-15 NOTE — CONSULT NOTE ADULT - SUBJECTIVE AND OBJECTIVE BOX
Patient is a 72y old  Male who presents with a chief complaint of     HPI:  72 year old male with PMHx of lung cancer (s/p right lobectomy and chemo in 2016), COPD (on 3L home O2), active smoker, HTN, CAD s/p cardiac stent, polio, GERD, presents to ED c/p SOB x4 days. Pt reports worsening shortness of breath, not alleviated by home inhalers, worse with ambulation. He was recently treated with a 10 day course of prednisone. Pt does report chronic dry cough. He denies fever, chills, productive cough, chest pain, urinary complaints, leg edema. Pt denies sick contacts.   Pt is also currently being worked up for arrhythmia and has Holter monitor placed over a week ago. Reportedly he got a call from cardiologist this morning stating episodes of arrhyhtmia were noted on monitor.  In the ED pt afebrile, tachycardic to 166, tachypneic, was placed on BIPAP with improvement. CXR demonstrated new predominantly right middle lobe airspace consolidation with additional bilateral centrilobular nodular opacities and medial right lower lobe patchy opacity.  Pt received Ceftiaxone/Azithro, Duoneb x2 and Mg 2g.  he was seen by Dr bass last week started on trelegy and steroids   today feel better       PAST MEDICAL & SURGICAL HISTORY:  Coronary artery disease      Myocardial infarction  x 2      Polio      Lung cancer  RIGHT LUNG, UPPER LOBECTOMY 2015      Brain concussion  October, 2017      Chronic GERD      History of lobectomy of lung  UPPER, RIGHT      H/O left knee surgery  TO CORRECT POLIO      History of tonsillectomy and adenoidectomy  [facility]ILDHOOD      S/P appendectomy  [facility]OD      H/O colonoscopy  2013      Cataract, right eye  with IOL placement, Oct. 2018      H/O heart artery stent      History of coronary artery stent placement        Allergies    No Known Allergies    Intolerances      Family history : no cardiovscular family history   Home Medications:  amLODIPine 5 mg oral tablet: 1 tab(s) orally once a day (14 Jun 2022 14:52)  Metoprolol Succinate ER 50 mg oral tablet, extended release: 1 tab(s) orally once a day (14 Jun 2022 14:52)  NexIUM 24HR 20 mg oral delayed release capsule: 1 cap(s) orally once a day (14 Jun 2022 14:52)  piroxicam 20 mg oral capsule: 1 cap(s) orally once a day (14 Jun 2022 14:52)  traZODone 150 mg oral tablet: orally once a day (14 Jun 2022 14:52)  Trelegy Ellipta 100 mcg-62.5 mcg-25 mcg/inh inhalation powder: 1 puff(s) inhaled once a day (14 Jun 2022 14:52)    Occupation:  Alochol: Denied  Smoking: Denied  Drug Use: Denied  Marital Status:         ROS: as in HPI; All other systems reviewed are negative    ICU Vital Signs Last 24 Hrs  T(C): 36.1 (15 Michael 2022 05:00), Max: 36.1 (15 Michael 2022 05:00)  T(F): 96.9 (15 Michael 2022 05:00), Max: 96.9 (15 Michael 2022 05:00)  HR: 87 (15 Michael 2022 05:00) (75 - 165)  BP: 131/66 (15 Michael 2022 05:00) (131/66 - 152/66)  BP(mean): --  ABP: --  ABP(mean): --  RR: 18 (15 Michael 2022 05:00) (16 - 20)  SpO2: 96% (15 Michael 2022 06:21) (96% - 98%)        Physical Examination:    General: No acute distress.  Alert, oriented, interactive, nonfocal    HEENT: Pupils equal, reactive to light.  Symmetric.    PULM: b/l wheeze   CVS: Regular rate and rhythm, no murmurs, rubs, or gallops    ABD: Soft, nondistended, nontender, normoactive bowel sounds, no masses    EXT: No edema, nontender, no clubbing     SKIN: Warm and well perfused, no rashes noted.    Neurology : no motor or sensory deficit     Musculoskeletal : move all extremity     Lymphatic system: no Palpable node               I&O's Detail        LABS:                        13.9   9.65  )-----------( 247      ( 15 Michael 2022 06:49 )             44.0     15 Michael 2022 06:49    143    |  101    |  46     ----------------------------<  118    4.4     |  31     |  0.8      Ca    8.8        15 Michael 2022 06:49    TPro  5.9    /  Alb  3.5    /  TBili  0.2    /  DBili  x      /  AST  13     /  ALT  9      /  AlkPhos  124    15 Jun 2022 06:49  Amylase x     lipase x          CARDIAC MARKERS ( 14 Jun 2022 10:00 )  x     / <0.01 ng/mL / x     / x     / x          CAPILLARY BLOOD GLUCOSE            Culture        MEDICATIONS  (STANDING):  albuterol/ipratropium for Nebulization 3 milliLiter(s) Nebulizer every 6 hours  amLODIPine   Tablet 5 milliGRAM(s) Oral daily  azithromycin  IVPB 500 milliGRAM(s) IV Intermittent every 24 hours  budesonide 160 MICROgram(s)/formoterol 4.5 MICROgram(s) Inhaler 2 Puff(s) Inhalation two times a day  cefTRIAXone   IVPB 1000 milliGRAM(s) IV Intermittent every 24 hours  heparin   Injectable 5000 Unit(s) SubCutaneous every 8 hours  influenza  Vaccine (HIGH DOSE) 0.7 milliLiter(s) IntraMuscular once  metoprolol succinate ER 50 milliGRAM(s) Oral daily  naproxen 500 milliGRAM(s) Oral two times a day  pantoprazole    Tablet 40 milliGRAM(s) Oral before breakfast  predniSONE   Tablet 20 milliGRAM(s) Oral daily  traZODone 150 milliGRAM(s) Oral at bedtime    MEDICATIONS  (PRN):  acetaminophen     Tablet .. 650 milliGRAM(s) Oral every 6 hours PRN Temp greater or equal to 38C (100.4F), Mild Pain (1 - 3)        RADIOLOGY: ***     CXR:  TLC:  OG:  ET tube:        CAM ICU:  ECHO:

## 2022-06-15 NOTE — CONSULT NOTE ADULT - SUBJECTIVE AND OBJECTIVE BOX
Patient is a 72y old  Male who presents with a chief complaint of SOB (15 Michael 2022 13:33)  H72 year old male with PMHx of lung cancer (s/p right lobectomy and chemo in 2016), COPD (on 3L home O2), active smoker, HTN, CAD s/p cardiac stent, polio, GERD, presents to ED c/p SOB x4 days. Pt reports worsening shortness of breath, not alleviated by home inhalers, worse with ambulation. He was recently treated with a 10 day course of prednisone. Pt does report chronic dry cough. He denies fever, chills, productive cough, chest pain, urinary complaints, leg edema. Pt denies sick contacts.   Pt is also currently being worked up for arrhythmia and has Holter monitor placed over a week ago. Reportedly he got a call from cardiologist this morning stating episodes of arrhyhtmia were noted on monitor.  In the ED pt afebrile, tachycardic to 166, tachypneic, was placed on BIPAP with improvement. CXR demonstrated new predominantly right middle lobe airspace consolidation with additional bilateral centrilobular nodular opacities and medial right lower lobe patchy opacity.  Pt received Ceftiaxone/Azithro, Duoneb x2 and Mg 2g.        REVIEW OF SYSTEMS: Total of twelve systems have been reviewed with patient and found to be negative unless mentioned in HPI      PAST MEDICAL & SURGICAL HISTORY:  Coronary artery disease  Myocardial infarction X 2  Lung cancer  RIGHT LUNG, UPPER LOBECTOMY 2015  Brain concussion October, 2017  Chronic GERD  History of l Right Upper Lobectomy of lung  H/O left knee surgery TO CORRECT POLIO  History of tonsillectomy and adenoidectomy  S/P appendectomy in [facility]ILDHOOD  H/O colonoscopy, 2013  Cataract, right eye  with IOL placement, Oct. 2018  H/O heart artery stent  History of coronary artery stent placement        SOCIAL HISTORY  Alcohol: Does not drink  Tobacco: Does not smoke  Illicit substance use: None      FAMILY HISTORY: Non contributory to the present illness        ALLERGIES: No Known Allergies      Vital Signs Last 24 Hrs  T(C): 36 (15 Michael 2022 13:07), Max: 36.1 (15 Michael 2022 05:00)  T(F): 96.8 (15 Michael 2022 13:07), Max: 96.9 (15 Michael 2022 05:00)  HR: 92 (15 Michael 2022 13:07) (75 - 115)  BP: 141/67 (15 Michael 2022 13:07) (131/66 - 142/74)  BP(mean): --  RR: 20 (15 Michael 2022 13:07) (18 - 20)  SpO2: 95% (15 Michael 2022 10:00) (95% - 97%)        PHYSICAL EXAM:  GENERAL: Not in distress   CHEST/LUNG:  Aire ntry bilaterally  HEART: s1 and s2 present  ABDOMEN:  Nontender and  Nondistended  EXTREMITIES: No pedal  edema  CNS: Awake and Alert      LABS:                        13.9   9.65  )-----------( 247      ( 15 Michael 2022 06:49 )             44.0     06-15    143  |  101  |  46<H>  ----------------------------<  118<H>  4.4   |  31  |  0.8    Ca    8.8      15 Michael 2022 06:49    TPro  5.9<L>  /  Alb  3.5  /  TBili  0.2  /  DBili  x   /  AST  13  /  ALT  9   /  AlkPhos  124<H>  06-15        MEDICATIONS  (STANDING):  albuterol/ipratropium for Nebulization 3 milliLiter(s) Nebulizer every 6 hours  amLODIPine   Tablet 5 milliGRAM(s) Oral daily  azithromycin  IVPB 500 milliGRAM(s) IV Intermittent every 24 hours  budesonide 160 MICROgram(s)/formoterol 4.5 MICROgram(s) Inhaler 2 Puff(s) Inhalation two times a day  cefTRIAXone   IVPB 1000 milliGRAM(s) IV Intermittent every 24 hours  heparin   Injectable 5000 Unit(s) SubCutaneous every 8 hours  influenza  Vaccine (HIGH DOSE) 0.7 milliLiter(s) IntraMuscular once  methylPREDNISolone sodium succinate Injectable 60 milliGRAM(s) IV Push every 12 hours  naproxen 500 milliGRAM(s) Oral two times a day  pantoprazole    Tablet 40 milliGRAM(s) Oral before breakfast  traZODone 150 milliGRAM(s) Oral at bedtime    MEDICATIONS  (PRN):  acetaminophen     Tablet .. 650 milliGRAM(s) Oral every 6 hours PRN Temp greater or equal to 38C (100.4F), Mild Pain (1 - 3)          RADIOLOGY & ADDITIONAL TESTS:    < from: CT Chest No Cont (06.14.22 @ 12:01) >    New predominantly right middle lobe airspace consolidation with   additional bilateral centrilobular nodular opacities and medial right   lower lobe patchy opacity. Findings compatible with pneumonia in the   appropriate clinical setting.    Status post right upper lobectomy.    No pneumothorax.      < from: Xray Chest 1 View-PORTABLE IMMEDIATE (06.14.22 @ 10:59) >    Possible new right apical pneumothorax with airgap 1.3 cm. Further   evaluation with noncontrast chest CT is recommended    Increasing right lung opacities.        MICROBIOLOGY DATA:    Respiratory Viral Panel with COVID-19 by CORINNE (06.14.22 @ 10:15)   Rapid RVP Result: NotDete   SARS-CoV-2: NotDetec:         Patient is a 72y old  Male with PMHx of lung cancer (s/p right lobectomy and chemo in 2016), COPD (on 3L home O2), active smoker, HTN, CAD s/p cardiac stent, polio, GERD,  Now presents to the ER for evaluation of worsening shortness of breath, not alleviated by home inhalers, worse with ambulation. He was recently treated with a 10 day course of prednisone. Pt does report chronic dry cough. He denies fever, chills, productive cough. He is also currently being worked up for arrhythmia and has Holter monitor placed over a week ago. Reportedly he got a call from cardiologist this morning stating episodes of arrhyhtmia were noted on monitor. On admission, he found to have no fever but tachycardia, Leukocytosis, and tachypneic, was placed on BIPAP with improvement.  The CXR  shows New predominantly right middle lobe airspace consolidation with  additional bilateral centrilobular nodular opacities and medial right lower lobe patchy opacity. He has started on Ceftriaxone and Azithromycin, and the ID consult requested to assist with further evaluation and antibiotic management.       REVIEW OF SYSTEMS: Total of twelve systems have been reviewed and found to be negative unless mentioned in HPI      PAST MEDICAL & SURGICAL HISTORY:  Coronary artery disease  Myocardial infarction X 2  Lung cancer  RIGHT LUNG, UPPER LOBECTOMY 2015  Brain concussion October, 2017  Chronic GERD  History of l Right Upper Lobectomy of lung  H/O left knee surgery TO CORRECT POLIO  History of tonsillectomy and adenoidectomy  S/P appendectomy in CHILDHOOD  H/O colonoscopy, 2013  Cataract, right eye  with IOL placement, Oct. 2018  H/O heart artery stent  History of coronary artery stent placement        SOCIAL HISTORY  Alcohol: Does not drink  Tobacco: Does not smoke  Illicit substance use: None      FAMILY HISTORY: Non contributory to the present illness      ALLERGIES: No Known Allergies      Vital Signs Last 24 Hrs  T(C): 36 (15 Michael 2022 13:07), Max: 36.1 (15 Michael 2022 05:00)  T(F): 96.8 (15 Michael 2022 13:07), Max: 96.9 (15 Michael 2022 05:00)  HR: 92 (15 Michael 2022 13:07) (75 - 115)  BP: 141/67 (15 Michael 2022 13:07) (131/66 - 142/74)  BP(mean): --  RR: 20 (15 Michael 2022 13:07) (18 - 20)  SpO2: 95% (15 Michael 2022 10:00) (95% - 97%)        PHYSICAL EXAM:  GENERAL: Not in distress , on BIPAP  CHEST/LUNG:  Not using accessory muscle   HEART: s1 and s2 present  ABDOMEN:  Nontender and  Nondistended  EXTREMITIES: No pedal  edema  CNS: Awake and Alert      LABS:                        13.9   9.65  )-----------( 247      ( 15 Michael 2022 06:49 )             44.0       06-15    143  |  101  |  46<H>  ----------------------------<  118<H>  4.4   |  31  |  0.8    Ca    8.8      15 Michael 2022 06:49    TPro  5.9<L>  /  Alb  3.5  /  TBili  0.2  /  DBili  x   /  AST  13  /  ALT  9   /  AlkPhos  124<H>  06-15        MEDICATIONS  (STANDING):  albuterol/ipratropium for Nebulization 3 milliLiter(s) Nebulizer every 6 hours  amLODIPine   Tablet 5 milliGRAM(s) Oral daily  azithromycin  IVPB 500 milliGRAM(s) IV Intermittent every 24 hours  budesonide 160 MICROgram(s)/formoterol 4.5 MICROgram(s) Inhaler 2 Puff(s) Inhalation two times a day  cefTRIAXone   IVPB 1000 milliGRAM(s) IV Intermittent every 24 hours  heparin   Injectable 5000 Unit(s) SubCutaneous every 8 hours  influenza  Vaccine (HIGH DOSE) 0.7 milliLiter(s) IntraMuscular once  methylPREDNISolone sodium succinate Injectable 60 milliGRAM(s) IV Push every 12 hours  naproxen 500 milliGRAM(s) Oral two times a day  pantoprazole    Tablet 40 milliGRAM(s) Oral before breakfast  traZODone 150 milliGRAM(s) Oral at bedtime    MEDICATIONS  (PRN):  acetaminophen     Tablet .. 650 milliGRAM(s) Oral every 6 hours PRN Temp greater or equal to 38C (100.4F), Mild Pain (1 - 3)        RADIOLOGY & ADDITIONAL TESTS:    6/14/22: CT Chest No Cont (06.14.22 @ 12:01) >    New predominantly right middle lobe airspace consolidation with   additional bilateral centrilobular nodular opacities and medial right   lower lobe patchy opacity. Findings compatible with pneumonia in the   appropriate clinical setting.    Status post right upper lobectomy.    No pneumothorax.      < from: Xray Chest 1 View-PORTABLE IMMEDIATE (06.14.22 @ 10:59) >    Possible new right apical pneumothorax with airgap 1.3 cm. Further   evaluation with noncontrast chest CT is recommended    Increasing right lung opacities.        MICROBIOLOGY DATA:    Respiratory Viral Panel with COVID-19 by CORINNE (06.14.22 @ 10:15)   Rapid RVP Result: NotDetec   SARS-CoV-2: NotDetec:

## 2022-06-15 NOTE — CONSULT NOTE ADULT - ASSESSMENT
Impression   copd exacerbation   PNA gram negative ??    plan   office note reviewed   continue abx   check procal   bld cx   urine legionella and strep antigen  solumedrol 60 mg Q 12 hrs   nebulizer Q4 hrs and prn   upon d/c need to be on laba/lama inhaled steroids such as trelegy or symbicort and spiriva

## 2022-06-15 NOTE — CONSULT NOTE ADULT - ASSESSMENT
# Sepsis (  Tachycardia + Leukocytosis)  # Pneumonia    would recommend:    1. Blood cultures X 2 in the setting of sepsis  2. Obtain MRSA PCR and Legionella urine AG  3. Supplemental oxygenation and bronchodilator  as needed  4. Continue Ceftriaxone and Azithromycin until work up is done    will follow the patient with you and make further recommendation based on the clinical course and Lab results  Thank you for the opportunity to participate in Mr. MURPHY's care      Attending Attestation:    Spent more than 65 minutes on total encounter, more than 50 % of the visit was spent counseling and/or coordinating care by the Attending physician.       Patient is a 72y old  Male with PMHx of lung cancer (s/p right lobectomy and chemo in 2016), COPD (on 3L home O2), active smoker, HTN, CAD s/p cardiac stent, polio, GERD,  Now presents to the ER for evaluation of worsening shortness of breath, not alleviated by home inhalers, worse with ambulation. He was recently treated with a 10 day course of prednisone. Pt does report chronic dry cough. He denies fever, chills, productive cough. He is also currently being worked up for arrhythmia and has Holter monitor placed over a week ago. Reportedly he got a call from cardiologist this morning stating episodes of arrhyhtmia were noted on monitor. On admission, he found to have no fever but tachycardia, Leukocytosis, and tachypneic, was placed on BIPAP with improvement.  The CXR  shows New predominantly right middle lobe airspace consolidation with  additional bilateral centrilobular nodular opacities and medial right lower lobe patchy opacity. He has started on Ceftriaxone and Azithromycin, and the ID consult requested to assist with further evaluation and antibiotic management.     # Sepsis (  Tachycardia + Leukocytosis)  # Pneumonia -  The CXR  shows New predominantly right middle lobe airspace consolidation with  additional bilateral centrilobular nodular opacities and medial right lower lobe patchy opacity.     would recommend:    1. Blood cultures X 2 in the setting of sepsis  2. Obtain MRSA PCR and Legionella urine AG  3. Supplemental oxygenation and bronchodilator  as needed  4. Continue Ceftriaxone and Azithromycin until work up is done  5. Management of BIPAP as per primary team  6. Aspiration precaution     will follow the patient with you and make further recommendation based on the clinical course and Lab results  Thank you for the opportunity to participate in Mr. MURPHY's care      Attending Attestation:    Spent more than 65 minutes on total encounter, more than 50 % of the visit was spent counseling and/or coordinating care by the Attending physician.

## 2022-06-16 LAB
ALBUMIN SERPL ELPH-MCNC: 3.7 G/DL — SIGNIFICANT CHANGE UP (ref 3.5–5.2)
ALP SERPL-CCNC: 121 U/L — HIGH (ref 30–115)
ALT FLD-CCNC: 19 U/L — SIGNIFICANT CHANGE UP (ref 0–41)
ANION GAP SERPL CALC-SCNC: 13 MMOL/L — SIGNIFICANT CHANGE UP (ref 7–14)
AST SERPL-CCNC: 20 U/L — SIGNIFICANT CHANGE UP (ref 0–41)
BILIRUB SERPL-MCNC: 0.3 MG/DL — SIGNIFICANT CHANGE UP (ref 0.2–1.2)
BUN SERPL-MCNC: 44 MG/DL — HIGH (ref 10–20)
CALCIUM SERPL-MCNC: 8.9 MG/DL — SIGNIFICANT CHANGE UP (ref 8.5–10.1)
CHLORIDE SERPL-SCNC: 99 MMOL/L — SIGNIFICANT CHANGE UP (ref 98–110)
CO2 SERPL-SCNC: 31 MMOL/L — SIGNIFICANT CHANGE UP (ref 17–32)
CREAT SERPL-MCNC: 0.8 MG/DL — SIGNIFICANT CHANGE UP (ref 0.7–1.5)
EGFR: 94 ML/MIN/1.73M2 — SIGNIFICANT CHANGE UP
GLUCOSE SERPL-MCNC: 137 MG/DL — HIGH (ref 70–99)
HCT VFR BLD CALC: 45.1 % — SIGNIFICANT CHANGE UP (ref 42–52)
HGB BLD-MCNC: 13.8 G/DL — LOW (ref 14–18)
MCHC RBC-ENTMCNC: 29 PG — SIGNIFICANT CHANGE UP (ref 27–31)
MCHC RBC-ENTMCNC: 30.6 G/DL — LOW (ref 32–37)
MCV RBC AUTO: 94.7 FL — HIGH (ref 80–94)
NRBC # BLD: 0 /100 WBCS — SIGNIFICANT CHANGE UP (ref 0–0)
PLATELET # BLD AUTO: 281 K/UL — SIGNIFICANT CHANGE UP (ref 130–400)
POTASSIUM SERPL-MCNC: 4.9 MMOL/L — SIGNIFICANT CHANGE UP (ref 3.5–5)
POTASSIUM SERPL-SCNC: 4.9 MMOL/L — SIGNIFICANT CHANGE UP (ref 3.5–5)
PROCALCITONIN SERPL-MCNC: 0.12 NG/ML — HIGH (ref 0.02–0.1)
PROT SERPL-MCNC: 6.3 G/DL — SIGNIFICANT CHANGE UP (ref 6–8)
RBC # BLD: 4.76 M/UL — SIGNIFICANT CHANGE UP (ref 4.7–6.1)
RBC # FLD: 13.6 % — SIGNIFICANT CHANGE UP (ref 11.5–14.5)
SODIUM SERPL-SCNC: 143 MMOL/L — SIGNIFICANT CHANGE UP (ref 135–146)
TSH SERPL-MCNC: 0.36 UIU/ML — SIGNIFICANT CHANGE UP (ref 0.27–4.2)
WBC # BLD: 11.89 K/UL — HIGH (ref 4.8–10.8)
WBC # FLD AUTO: 11.89 K/UL — HIGH (ref 4.8–10.8)

## 2022-06-16 PROCEDURE — 99232 SBSQ HOSP IP/OBS MODERATE 35: CPT

## 2022-06-16 RX ADMIN — Medication 3 MILLILITER(S): at 19:22

## 2022-06-16 RX ADMIN — HEPARIN SODIUM 5000 UNIT(S): 5000 INJECTION INTRAVENOUS; SUBCUTANEOUS at 14:28

## 2022-06-16 RX ADMIN — Medication 75 MILLIGRAM(S): at 05:11

## 2022-06-16 RX ADMIN — Medication 60 MILLIGRAM(S): at 05:12

## 2022-06-16 RX ADMIN — Medication 150 MILLIGRAM(S): at 21:47

## 2022-06-16 RX ADMIN — AZITHROMYCIN 255 MILLIGRAM(S): 500 TABLET, FILM COATED ORAL at 15:12

## 2022-06-16 RX ADMIN — Medication 500 MILLIGRAM(S): at 17:21

## 2022-06-16 RX ADMIN — AMLODIPINE BESYLATE 5 MILLIGRAM(S): 2.5 TABLET ORAL at 05:12

## 2022-06-16 RX ADMIN — Medication 500 MILLIGRAM(S): at 17:51

## 2022-06-16 RX ADMIN — BUDESONIDE AND FORMOTEROL FUMARATE DIHYDRATE 2 PUFF(S): 160; 4.5 AEROSOL RESPIRATORY (INHALATION) at 21:46

## 2022-06-16 RX ADMIN — HEPARIN SODIUM 5000 UNIT(S): 5000 INJECTION INTRAVENOUS; SUBCUTANEOUS at 21:48

## 2022-06-16 RX ADMIN — Medication 3 MILLILITER(S): at 15:14

## 2022-06-16 RX ADMIN — SENNA PLUS 2 TABLET(S): 8.6 TABLET ORAL at 21:47

## 2022-06-16 RX ADMIN — Medication 60 MILLIGRAM(S): at 17:21

## 2022-06-16 RX ADMIN — HEPARIN SODIUM 5000 UNIT(S): 5000 INJECTION INTRAVENOUS; SUBCUTANEOUS at 05:16

## 2022-06-16 RX ADMIN — CEFTRIAXONE 100 MILLIGRAM(S): 500 INJECTION, POWDER, FOR SOLUTION INTRAMUSCULAR; INTRAVENOUS at 14:24

## 2022-06-16 RX ADMIN — Medication 3 MILLILITER(S): at 08:10

## 2022-06-16 RX ADMIN — BUDESONIDE AND FORMOTEROL FUMARATE DIHYDRATE 2 PUFF(S): 160; 4.5 AEROSOL RESPIRATORY (INHALATION) at 09:39

## 2022-06-16 RX ADMIN — PANTOPRAZOLE SODIUM 40 MILLIGRAM(S): 20 TABLET, DELAYED RELEASE ORAL at 05:11

## 2022-06-16 RX ADMIN — Medication 500 MILLIGRAM(S): at 05:10

## 2022-06-16 NOTE — PHYSICAL THERAPY INITIAL EVALUATION ADULT - ADDITIONAL COMMENTS
Per patient, he lives with wife in a 2-family home shared with daughter/grandchildren with no steps outside but has 12-13 steps inside with (L) rail going up to get to their apartment then another 3 steps with (R) rail going up to bedroom; uses cane and O2 at home as needed

## 2022-06-16 NOTE — PHYSICAL THERAPY INITIAL EVALUATION ADULT - PERTINENT HX OF CURRENT PROBLEM, REHAB EVAL
71 y/o male admitted with diagnoses if Pneumonia, Afib with c/o SOB at home, initially placed on BIPAP, now on tele/O2 via NC

## 2022-06-16 NOTE — PHYSICAL THERAPY INITIAL EVALUATION ADULT - ACTIVE RANGE OF MOTION EXAMINATION, REHAB EVAL
RLE joints required AAROM/PROM to complete/bilateral upper extremity Active ROM was WFL (within functional limits)/Left LE Active ROM was WFL (within functional limits)

## 2022-06-16 NOTE — PHYSICAL THERAPY INITIAL EVALUATION ADULT - GENERAL OBSERVATIONS, REHAB EVAL
08:40-09:05 Chart reviewed. Pt encountered semireclined in bed,  may be seen by Physical Therapist as confirmed with Nurse. Patient denied pain at rest and ready to get up now but can not walk far without the brace for(R) leg; +tele; +o2 via NC; roxy REHMAN

## 2022-06-16 NOTE — PHYSICAL THERAPY INITIAL EVALUATION ADULT - LEVEL OF INDEPENDENCE: STAIR NEGOTIATION, REHAB EVAL
secondary to easy fatigability with ambulation on level with rolling walker and lack of brace for RLE for more efficient ambulation/unable to perform

## 2022-06-16 NOTE — PHYSICAL THERAPY INITIAL EVALUATION ADULT - GAIT DEVIATIONS NOTED, PT EVAL
stooped posture, dec heel strike/pushoff /stance RLE with RLE foot drag/decreased alex/decreased step length/decreased weight-shifting ability

## 2022-06-16 NOTE — PHYSICAL THERAPY INITIAL EVALUATION ADULT - MANUAL MUSCLE TESTING RESULTS, REHAB EVAL
BUE/LLE ms strength grossly graded 3+ to 4-/5 ; (R) HIP/knee grossly 1+ to 2-/5; (R) foor 0/5 except for toe flexors 1+ to 2-/5

## 2022-06-17 LAB
ALBUMIN SERPL ELPH-MCNC: 3.7 G/DL — SIGNIFICANT CHANGE UP (ref 3.5–5.2)
ALP SERPL-CCNC: 113 U/L — SIGNIFICANT CHANGE UP (ref 30–115)
ALT FLD-CCNC: 28 U/L — SIGNIFICANT CHANGE UP (ref 0–41)
ANION GAP SERPL CALC-SCNC: 13 MMOL/L — SIGNIFICANT CHANGE UP (ref 7–14)
AST SERPL-CCNC: 25 U/L — SIGNIFICANT CHANGE UP (ref 0–41)
BILIRUB SERPL-MCNC: 0.3 MG/DL — SIGNIFICANT CHANGE UP (ref 0.2–1.2)
BUN SERPL-MCNC: 39 MG/DL — HIGH (ref 10–20)
CALCIUM SERPL-MCNC: 8.8 MG/DL — SIGNIFICANT CHANGE UP (ref 8.5–10.1)
CHLORIDE SERPL-SCNC: 97 MMOL/L — LOW (ref 98–110)
CO2 SERPL-SCNC: 29 MMOL/L — SIGNIFICANT CHANGE UP (ref 17–32)
CREAT SERPL-MCNC: 0.8 MG/DL — SIGNIFICANT CHANGE UP (ref 0.7–1.5)
EGFR: 94 ML/MIN/1.73M2 — SIGNIFICANT CHANGE UP
GLUCOSE SERPL-MCNC: 148 MG/DL — HIGH (ref 70–99)
HCT VFR BLD CALC: 44.2 % — SIGNIFICANT CHANGE UP (ref 42–52)
HGB BLD-MCNC: 13.7 G/DL — LOW (ref 14–18)
MCHC RBC-ENTMCNC: 29 PG — SIGNIFICANT CHANGE UP (ref 27–31)
MCHC RBC-ENTMCNC: 31 G/DL — LOW (ref 32–37)
MCV RBC AUTO: 93.6 FL — SIGNIFICANT CHANGE UP (ref 80–94)
NRBC # BLD: 0 /100 WBCS — SIGNIFICANT CHANGE UP (ref 0–0)
PLATELET # BLD AUTO: 279 K/UL — SIGNIFICANT CHANGE UP (ref 130–400)
POTASSIUM SERPL-MCNC: 4.9 MMOL/L — SIGNIFICANT CHANGE UP (ref 3.5–5)
POTASSIUM SERPL-SCNC: 4.9 MMOL/L — SIGNIFICANT CHANGE UP (ref 3.5–5)
PROT SERPL-MCNC: 6.1 G/DL — SIGNIFICANT CHANGE UP (ref 6–8)
RBC # BLD: 4.72 M/UL — SIGNIFICANT CHANGE UP (ref 4.7–6.1)
RBC # FLD: 13.3 % — SIGNIFICANT CHANGE UP (ref 11.5–14.5)
SODIUM SERPL-SCNC: 139 MMOL/L — SIGNIFICANT CHANGE UP (ref 135–146)
WBC # BLD: 11.14 K/UL — HIGH (ref 4.8–10.8)
WBC # FLD AUTO: 11.14 K/UL — HIGH (ref 4.8–10.8)

## 2022-06-17 PROCEDURE — 99232 SBSQ HOSP IP/OBS MODERATE 35: CPT

## 2022-06-17 RX ADMIN — HEPARIN SODIUM 5000 UNIT(S): 5000 INJECTION INTRAVENOUS; SUBCUTANEOUS at 05:40

## 2022-06-17 RX ADMIN — Medication 3 MILLILITER(S): at 13:18

## 2022-06-17 RX ADMIN — Medication 500 MILLIGRAM(S): at 17:25

## 2022-06-17 RX ADMIN — PANTOPRAZOLE SODIUM 40 MILLIGRAM(S): 20 TABLET, DELAYED RELEASE ORAL at 06:04

## 2022-06-17 RX ADMIN — Medication 500 MILLIGRAM(S): at 05:41

## 2022-06-17 RX ADMIN — AZITHROMYCIN 255 MILLIGRAM(S): 500 TABLET, FILM COATED ORAL at 13:36

## 2022-06-17 RX ADMIN — Medication 75 MILLIGRAM(S): at 05:41

## 2022-06-17 RX ADMIN — Medication 3 MILLILITER(S): at 03:09

## 2022-06-17 RX ADMIN — BUDESONIDE AND FORMOTEROL FUMARATE DIHYDRATE 2 PUFF(S): 160; 4.5 AEROSOL RESPIRATORY (INHALATION) at 20:56

## 2022-06-17 RX ADMIN — HEPARIN SODIUM 5000 UNIT(S): 5000 INJECTION INTRAVENOUS; SUBCUTANEOUS at 21:37

## 2022-06-17 RX ADMIN — Medication 500 MILLIGRAM(S): at 06:39

## 2022-06-17 RX ADMIN — CEFTRIAXONE 100 MILLIGRAM(S): 500 INJECTION, POWDER, FOR SOLUTION INTRAMUSCULAR; INTRAVENOUS at 13:35

## 2022-06-17 RX ADMIN — BUDESONIDE AND FORMOTEROL FUMARATE DIHYDRATE 2 PUFF(S): 160; 4.5 AEROSOL RESPIRATORY (INHALATION) at 08:17

## 2022-06-17 RX ADMIN — Medication 3 MILLILITER(S): at 20:57

## 2022-06-17 RX ADMIN — Medication 60 MILLIGRAM(S): at 05:41

## 2022-06-17 RX ADMIN — HEPARIN SODIUM 5000 UNIT(S): 5000 INJECTION INTRAVENOUS; SUBCUTANEOUS at 13:36

## 2022-06-17 RX ADMIN — Medication 60 MILLIGRAM(S): at 13:37

## 2022-06-17 RX ADMIN — Medication 3 MILLILITER(S): at 07:55

## 2022-06-17 RX ADMIN — Medication 500 MILLIGRAM(S): at 17:24

## 2022-06-17 RX ADMIN — Medication 150 MILLIGRAM(S): at 21:37

## 2022-06-17 RX ADMIN — AMLODIPINE BESYLATE 5 MILLIGRAM(S): 2.5 TABLET ORAL at 05:41

## 2022-06-17 NOTE — PROGRESS NOTE ADULT - SUBJECTIVE AND OBJECTIVE BOX
CC.  SOB  HPI.  Patient reports SOB resolved.  Cough improving  and cough are improving.  afebrile  Offers no other complaints               Constitutional: No fever, fatigue or weight loss.  Skin: No rash.  Eyes: No recent vision problems or eye pain.  ENT: No congestion, ear pain, or sore throat.  Endocrine: No thyroid problems.  Cardiovascular: No chest pain or palpation.  Respiratory: No congestions  Gastrointestinal: No abdominal pain, nausea, vomiting, or diarrhea.  Genitourinary: No dysuria.  Musculoskeletal: No joint swelling.  Neurologic: No headache.    Vital Signs Last 24 Hrs  T(C): 36.1 (17 Jun 2022 05:51), Max: 36.1 (16 Jun 2022 21:40)  T(F): 96.9 (17 Jun 2022 05:51), Max: 96.9 (16 Jun 2022 21:40)  HR: 75 (17 Jun 2022 05:51) (75 - 89)  BP: 133/60 (17 Jun 2022 05:51) (125/74 - 133/60)  BP(mean): --  RR: 18 (17 Jun 2022 05:51) (18 - 18)  SpO2: 98% (17 Jun 2022 10:54) (97% - 98%)      PHYSICAL EXAM-  GENERAL: NAD   HEAD:  Atraumatic, Normocephalic  EYES: EOMI, PERRLA, conjunctiva and sclera clear  NECK: Supple, No JVD, Normal thyroid  NERVOUS SYSTEM:  Alert & Oriented X3, Moving all extremities  CHEST/LUNG: + Mild wheezing with good air entry  HEART: Regular rate and rhythm; No murmurs, rubs, or gallops  ABDOMEN: Soft, Nontender, Nondistended; Bowel sounds present  EXTREMITIES:    No clubbing, cyanosis, or edema  SKIN: No rashes or lesions                            13.7   11.14 )-----------( 279      ( 17 Jun 2022 06:54 )             44.2     06-17    139  |  97<L>  |  39<H>  ----------------------------<  148<H>  4.9   |  29  |  0.8    Ca    8.8      17 Jun 2022 06:54    TPro  6.1  /  Alb  3.7  /  TBili  0.3  /  DBili  x   /  AST  25  /  ALT  28  /  AlkPhos  113  06-17    CARDIAC MARKERS ( 15 Michael 2022 21:52 )  x     / <0.01 ng/mL / x     / x     / x      CARDIAC MARKERS ( 15 Michael 2022 15:41 )  x     / <0.01 ng/mL / x     / x     / x                                               MEDICATIONS  (STANDING):  albuterol/ipratropium for Nebulization 3 milliLiter(s) Nebulizer every 6 hours  amLODIPine   Tablet 5 milliGRAM(s) Oral daily  azithromycin  IVPB 500 milliGRAM(s) IV Intermittent every 24 hours  budesonide 160 MICROgram(s)/formoterol 4.5 MICROgram(s) Inhaler 2 Puff(s) Inhalation two times a day  cefTRIAXone   IVPB 1000 milliGRAM(s) IV Intermittent every 24 hours  heparin   Injectable 5000 Unit(s) SubCutaneous every 8 hours  influenza  Vaccine (HIGH DOSE) 0.7 milliLiter(s) IntraMuscular once  methylPREDNISolone sodium succinate Injectable 60 milliGRAM(s) IV Push every 12 hours  naproxen 500 milliGRAM(s) Oral two times a day  pantoprazole    Tablet 40 milliGRAM(s) Oral before breakfast  traZODone 150 milliGRAM(s) Oral at bedtime    MEDICATIONS  (PRN):  acetaminophen     Tablet .. 650 milliGRAM(s) Oral every 6 hours PRN Temp greater or equal to 38C (100.4F), Mild Pain (1 - 3)          Imaging Personally Reviewed:     [x ] YES  [ ] NO    Consultant(s) Notes Reviewed:  [x ] YES  [ ] NO    Care Discussed with Consultants/Other Providers [x ] YES  [ ] No medical contraindication for discharge  
The chart hs been reviewed.      # Sepsis (  Tachycardia + Leukocytosis)  # Pneumonia    would recommend:    1. Blood cultures X 2 in the setting of sepsis  2. Obtain MRSA PCR and Legionella urine AG  3. Supplemental oxygenation and bronchodilator  as needed  4. Continue Ceftriaxone and Azithromycin until work up is done    - Full consult to follow    Thank you  
CC.  SOB  HPI.  Patient reports SOB and cough are improving.  still wheezing.  afebrile  Offers no other complaints               Constitutional: No fever, fatigue or weight loss.  Skin: No rash.  Eyes: No recent vision problems or eye pain.  ENT: No congestion, ear pain, or sore throat.  Endocrine: No thyroid problems.  Cardiovascular: No chest pain or palpation.  Respiratory: No congestions  Gastrointestinal: No abdominal pain, nausea, vomiting, or diarrhea.  Genitourinary: No dysuria.  Musculoskeletal: No joint swelling.  Neurologic: No headache.    Vital Signs Last 24 Hrs  T(C): 36.6 (16 Jun 2022 05:12), Max: 36.6 (16 Jun 2022 05:12)  T(F): 97.9 (16 Jun 2022 05:12), Max: 97.9 (16 Jun 2022 05:12)  HR: 71 (16 Jun 2022 05:12) (71 - 97)  BP: 143/70 (16 Jun 2022 05:12) (137/66 - 143/70)  BP(mean): --  RR: 18 (16 Jun 2022 05:12) (18 - 20)  SpO2: 98% (16 Jun 2022 07:28) (96% - 98%)        PHYSICAL EXAM-  GENERAL: NAD   HEAD:  Atraumatic, Normocephalic  EYES: EOMI, PERRLA, conjunctiva and sclera clear  NECK: Supple, No JVD, Normal thyroid  NERVOUS SYSTEM:  Alert & Oriented X3, Moving all extremities  CHEST/LUNG: + Mild wheezing with good air entry  HEART: Regular rate and rhythm; No murmurs, rubs, or gallops  ABDOMEN: Soft, Nontender, Nondistended; Bowel sounds present  EXTREMITIES:    No clubbing, cyanosis, or edema  SKIN: No rashes or lesions                            13.8   11.89 )-----------( 281      ( 16 Jun 2022 07:06 )             45.1     06-16    143  |  99  |  44<H>  ----------------------------<  137<H>  4.9   |  31  |  0.8    Ca    8.9      16 Jun 2022 07:06    TPro  6.3  /  Alb  3.7  /  TBili  0.3  /  DBili  x   /  AST  20  /  ALT  19  /  AlkPhos  121<H>  06-16    CARDIAC MARKERS ( 15 Michael 2022 21:52 )  x     / <0.01 ng/mL / x     / x     / x      CARDIAC MARKERS ( 15 Michael 2022 15:41 )  x     / <0.01 ng/mL / x     / x     / x              MEDICATIONS  (STANDING):  albuterol/ipratropium for Nebulization 3 milliLiter(s) Nebulizer every 6 hours  amLODIPine   Tablet 5 milliGRAM(s) Oral daily  azithromycin  IVPB 500 milliGRAM(s) IV Intermittent every 24 hours  budesonide 160 MICROgram(s)/formoterol 4.5 MICROgram(s) Inhaler 2 Puff(s) Inhalation two times a day  cefTRIAXone   IVPB 1000 milliGRAM(s) IV Intermittent every 24 hours  heparin   Injectable 5000 Unit(s) SubCutaneous every 8 hours  influenza  Vaccine (HIGH DOSE) 0.7 milliLiter(s) IntraMuscular once  methylPREDNISolone sodium succinate Injectable 60 milliGRAM(s) IV Push every 12 hours  naproxen 500 milliGRAM(s) Oral two times a day  pantoprazole    Tablet 40 milliGRAM(s) Oral before breakfast  traZODone 150 milliGRAM(s) Oral at bedtime    MEDICATIONS  (PRN):  acetaminophen     Tablet .. 650 milliGRAM(s) Oral every 6 hours PRN Temp greater or equal to 38C (100.4F), Mild Pain (1 - 3)          Imaging Personally Reviewed:     [x ] YES  [ ] NO    Consultant(s) Notes Reviewed:  [x ] YES  [ ] NO    Care Discussed with Consultants/Other Providers [x ] YES  [ ] No medical contraindication for discharge  
72 year old male with PMHx of lung cancer (s/p right lobectomy and chemo in 2016), COPD (on 3L home O2), active smoker, HTN, CAD s/p cardiac stent, polio, GERD, presents to ED c/p SOB x4 days. Placed on bipap in ED. Pt evaluated at bedside. Admitted to telemetry. Radiology tests and hospital records, were reviewed, as well as previous notes on this patient.  Pt is off bipap now, on NC, speaks in full sentences but dyspneic when ambulating even on oxygen.      PAST MEDICAL & SURGICAL HISTORY  Coronary artery disease  Myocardial infarction x 2  Polio  Lung cancer RIGHT LUNG, UPPER LOBECTOMY 2015  Brain concussion October, 2017  Chronic GERD  History of lobectomy of lung UPPER, RIGHT  H/O left knee surgery TO CORRECT POLIO  History of tonsillectomy and adenoidectomy CHILDHOOD  S/P appendectomy CHILDHOOD  H/O colonoscopy 2013  Cataract, right eye with IOL placement, Oct. 2018  H/O heart artery stent  History of coronary artery stent placement      FAMILY HISTORY:  MI (myocardial infarction) (Grandparent at 61 yo)      SOCIAL HISTORY:  []smoker - smoker  []Alcohol - denies  []Drug - denies    ALLERGIES:  No Known Allergies      MEDICATIONS:  MEDICATIONS  (STANDING):  albuterol/ipratropium for Nebulization 3 milliLiter(s) Nebulizer every 6 hours  amLODIPine   Tablet 5 milliGRAM(s) Oral daily  azithromycin  IVPB 500 milliGRAM(s) IV Intermittent every 24 hours  budesonide 160 MICROgram(s)/formoterol 4.5 MICROgram(s) Inhaler 2 Puff(s) Inhalation two times a day  cefTRIAXone   IVPB 1000 milliGRAM(s) IV Intermittent every 24 hours  heparin   Injectable 5000 Unit(s) SubCutaneous every 8 hours  influenza  Vaccine (HIGH DOSE) 0.7 milliLiter(s) IntraMuscular once  methylPREDNISolone sodium succinate Injectable 60 milliGRAM(s) IV Push every 12 hours  metoprolol succinate ER 75 milliGRAM(s) Oral daily  naproxen 500 milliGRAM(s) Oral two times a day  pantoprazole    Tablet 40 milliGRAM(s) Oral before breakfast  senna 2 Tablet(s) Oral at bedtime  traZODone 150 milliGRAM(s) Oral at bedtime  MEDICATIONS  (PRN):  acetaminophen     Tablet .. 650 milliGRAM(s) Oral every 6 hours PRN Temp greater or equal to 38C (100.4F), Mild Pain (1 - 3)      HOME MEDICATIONS:  amLODIPine 5 mg oral tablet: 1 tab(s) orally once a day (14 Jun 2022 14:52)  Metoprolol Succinate ER 50 mg oral tablet, extended release: 1 tab(s) orally once a day (14 Jun 2022 14:52)  NexIUM 24HR 20 mg oral delayed release capsule: 1 cap(s) orally once a day (14 Jun 2022 14:52)  piroxicam 20 mg oral capsule: 1 cap(s) orally once a day (14 Jun 2022 14:52)  traZODone 150 mg oral tablet: orally once a day (14 Jun 2022 14:52)  Trelegy Ellipta 100 mcg-62.5 mcg-25 mcg/inh inhalation powder: 1 puff(s) inhaled once a day (14 Jun 2022 14:52)      VITALS:   T(F): 97.9 (06-16 @ 05:12), Max: 97.9 (06-16 @ 05:12)  HR: 71 (06-16 @ 05:12) (71 - 166)  BP: 143/70 (06-16 @ 05:12) (131/66 - 192/93)  BP(mean): --  RR: 18 (06-16 @ 05:12) (16 - 34)  SpO2: 98% (06-16 @ 07:28) (95% - 98%)  I&O's Summary  15 Michael 2022 07:01  -  16 Jun 2022 07:00  --------------------------------------------------------  IN: 0 mL / OUT: 200 mL / NET: -200 mL  16 Jun 2022 07:01  -  16 Jun 2022 08:50  --------------------------------------------------------  IN: 0 mL / OUT: 100 mL / NET: -100 mL    PHYSICAL EXAM:  GEN: Not in acute distress, resting comfortably  NECK: no thyroid enlargement, no cervical adenopathy  LUNGS: wheezing and scattered rhonchi, on NC, no retractions, no nasal flaring  CARDIOVASCULAR: regularly irregular, S1/S2 present, RRR , no murmurs or rubs, no carotid bruits, no JVD,  + PP bilaterally  GI: Soft, non-tender, non-distended, +BS  NEURO: patient is awake , alert and oriented, no weakness, no facial drooping  Vascular: No LE edema, pedal pulses 2+  SKIN: Intact    LABS:                        13.8   11.89 )-----------( 281      ( 16 Jun 2022 07:06 )             45.1   06-15  143  |  101  |  46<H>  ----------------------------<  118<H>  4.4   |  31  |  0.8  Ca    8.8      15 Michael 2022 06:49  TPro  5.9<L>  /  Alb  3.5  /  TBili  0.2  /  DBili  x   /  AST  13  /  ALT  9   /  AlkPhos  124<H>  06-15    Troponin T, Serum: <0.01 ng/mL (06-15-22 @ 21:52)  Troponin T, Serum: <0.01 ng/mL (06-15-22 @ 15:41)  CARDIAC MARKERS ( 15 Michael 2022 21:52 )  x     / <0.01 ng/mL / x     / x     / x      CARDIAC MARKERS ( 15 Michael 2022 15:41 )  x     / <0.01 ng/mL / x     / x     / x      CARDIAC MARKERS ( 14 Jun 2022 10:00 )  x     / <0.01 ng/mL / x     / x     / x        Serum Pro-Brain Natriuretic Peptide: 550 pg/mL (06-14-22 @ 10:00)      RADIOLOGY:  ecg< from: 12 Lead ECG (06.14.22 @ 10:05) >  Diagnosis Line Sinus tachycardia with occasional Premature ventricular complexes  Right bundle branch block  Left posterior fascicular block  *** Bifascicular block ***  Abnormal ECG  < end of copied text >    ct chest< from: CT Chest No Cont (06.14.22 @ 12:01) >  New predominantly right middle lobe airspace consolidation with   additional bilateral centrilobular nodular opacities and medial right   lower lobe patchy opacity. Findings compatible with pneumonia in the   appropriate clinical setting.  Status post right upper lobectomy.  No pneumothorax.  --- End of Report ---  < end of copied text >    cxr< from: Xray Chest 1 View-PORTABLE IMMEDIATE (06.14.22 @ 10:59) >  Impression:  Possible new right apical pneumothorax with air gap 1.3 cm. Further   evaluation with noncontrast chest CT is recommended  Increasing right lung opacities.  --- End of Report ---  < end of copied text >    TELEMETRY EVENTS:  no events overnight  
CC.  SOB  HPI.  Patient reports SOB and cough are improving.  afebrile  Offers no other complaints               Constitutional: No fever, fatigue or weight loss.  Skin: No rash.  Eyes: No recent vision problems or eye pain.  ENT: No congestion, ear pain, or sore throat.  Endocrine: No thyroid problems.  Cardiovascular: No chest pain or palpation.  Respiratory: No congestions  Gastrointestinal: No abdominal pain, nausea, vomiting, or diarrhea.  Genitourinary: No dysuria.  Musculoskeletal: No joint swelling.  Neurologic: No headache.      Vital Signs Last 24 Hrs  T(C): 36 (06-15-22 @ 13:07), Max: 36.1 (06-15-22 @ 05:00)  T(F): 96.8 (06-15-22 @ 13:07), Max: 96.9 (06-15-22 @ 05:00)  HR: 92 (06-15-22 @ 13:07) (75 - 115)  BP: 141/67 (06-15-22 @ 13:07) (131/66 - 142/75)  BP(mean): --  RR: 20 (06-15-22 @ 13:07) (16 - 20)  SpO2: 95% (06-15-22 @ 10:00) (95% - 98%)        PHYSICAL EXAM-  GENERAL: NAD   HEAD:  Atraumatic, Normocephalic  EYES: EOMI, PERRLA, conjunctiva and sclera clear  NECK: Supple, No JVD, Normal thyroid  NERVOUS SYSTEM:  Alert & Oriented X3, Moving all extremities  CHEST/LUNG: + diffuse wheezing with rhonchi with good air entry  HEART: Regular rate and rhythm; No murmurs, rubs, or gallops  ABDOMEN: Soft, Nontender, Nondistended; Bowel sounds present  EXTREMITIES:    No clubbing, cyanosis, or edema  SKIN: No rashes or lesions                                  13.9   9.65  )-----------( 247      ( 15 Michael 2022 06:49 )             44.0     06-15    143  |  101  |  46<H>  ----------------------------<  118<H>  4.4   |  31  |  0.8    Ca    8.8      15 Michael 2022 06:49    TPro  5.9<L>  /  Alb  3.5  /  TBili  0.2  /  DBili  x   /  AST  13  /  ALT  9   /  AlkPhos  124<H>  06-15    CARDIAC MARKERS ( 14 Jun 2022 10:00 )  x     / <0.01 ng/mL / x     / x     / x                      MEDICATIONS  (STANDING):  albuterol/ipratropium for Nebulization 3 milliLiter(s) Nebulizer every 6 hours  amLODIPine   Tablet 5 milliGRAM(s) Oral daily  azithromycin  IVPB 500 milliGRAM(s) IV Intermittent every 24 hours  budesonide 160 MICROgram(s)/formoterol 4.5 MICROgram(s) Inhaler 2 Puff(s) Inhalation two times a day  cefTRIAXone   IVPB 1000 milliGRAM(s) IV Intermittent every 24 hours  heparin   Injectable 5000 Unit(s) SubCutaneous every 8 hours  influenza  Vaccine (HIGH DOSE) 0.7 milliLiter(s) IntraMuscular once  methylPREDNISolone sodium succinate Injectable 60 milliGRAM(s) IV Push every 12 hours  naproxen 500 milliGRAM(s) Oral two times a day  pantoprazole    Tablet 40 milliGRAM(s) Oral before breakfast  traZODone 150 milliGRAM(s) Oral at bedtime    MEDICATIONS  (PRN):  acetaminophen     Tablet .. 650 milliGRAM(s) Oral every 6 hours PRN Temp greater or equal to 38C (100.4F), Mild Pain (1 - 3)          Imaging Personally Reviewed:     [x ] YES  [ ] NO    Consultant(s) Notes Reviewed:  [x ] YES  [ ] NO    Care Discussed with Consultants/Other Providers [x ] YES  [ ] No medical contraindication for discharge  
Patient is a 72y old  Male who presents with a chief complaint of SOB (16 Jun 2022 12:39)      Over Night Events:  Patient seen and examined.   feel better very mild wheeze     ROS:  See HPI    PHYSICAL EXAM    ICU Vital Signs Last 24 Hrs  T(C): 36.1 (17 Jun 2022 05:51), Max: 36.1 (16 Jun 2022 21:40)  T(F): 96.9 (17 Jun 2022 05:51), Max: 96.9 (16 Jun 2022 21:40)  HR: 75 (17 Jun 2022 05:51) (75 - 89)  BP: 133/60 (17 Jun 2022 05:51) (125/74 - 133/60)  BP(mean): --  ABP: --  ABP(mean): --  RR: 18 (17 Jun 2022 05:51) (18 - 18)  SpO2: 97% (17 Jun 2022 05:36) (97% - 97%)      General: AOx3  HEENT:      francia          Lymph Nodes: NO cervical LN   Lungs: Bilateral BS  Cardiovascular: Regular   Abdomen: Soft, Positive BS  Extremities: No clubbing   Skin: warm   Neurological: no focal   Musculoskeletal: move all ext     I&O's Detail    16 Jun 2022 07:01  -  17 Jun 2022 07:00  --------------------------------------------------------  IN:  Total IN: 0 mL    OUT:    Voided (mL): 100 mL  Total OUT: 100 mL    Total NET: -100 mL          LABS:                          13.7   11.14 )-----------( 279      ( 17 Jun 2022 06:54 )             44.2         17 Jun 2022 06:54    139    |  97     |  39     ----------------------------<  148    4.9     |  29     |  0.8      Ca    8.8        17 Jun 2022 06:54    TPro  6.1    /  Alb  3.7    /  TBili  0.3    /  DBili  x      /  AST  25     /  ALT  28     /  AlkPhos  113    17 Jun 2022 06:54  Amylase x     lipase x                                                                                              CARDIAC MARKERS ( 15 Michael 2022 21:52 )  x     / <0.01 ng/mL / x     / x     / x      CARDIAC MARKERS ( 15 Michael 2022 15:41 )  x     / <0.01 ng/mL / x     / x     / x                                                            Culture - Blood (collected 15 Michael 2022 15:41)  Source: .Blood None  Preliminary Report (17 Jun 2022 01:03):    No growth to date.    Culture - Blood (collected 15 Michael 2022 15:41)  Source: .Blood None  Preliminary Report (17 Jun 2022 01:03):    No growth to date.                                                                                           MEDICATIONS  (STANDING):  albuterol/ipratropium for Nebulization 3 milliLiter(s) Nebulizer every 6 hours  amLODIPine   Tablet 5 milliGRAM(s) Oral daily  azithromycin  IVPB 500 milliGRAM(s) IV Intermittent every 24 hours  budesonide 160 MICROgram(s)/formoterol 4.5 MICROgram(s) Inhaler 2 Puff(s) Inhalation two times a day  cefTRIAXone   IVPB 1000 milliGRAM(s) IV Intermittent every 24 hours  heparin   Injectable 5000 Unit(s) SubCutaneous every 8 hours  influenza  Vaccine (HIGH DOSE) 0.7 milliLiter(s) IntraMuscular once  methylPREDNISolone sodium succinate Injectable 60 milliGRAM(s) IV Push every 12 hours  metoprolol succinate ER 75 milliGRAM(s) Oral daily  naproxen 500 milliGRAM(s) Oral two times a day  pantoprazole    Tablet 40 milliGRAM(s) Oral before breakfast  senna 2 Tablet(s) Oral at bedtime  traZODone 150 milliGRAM(s) Oral at bedtime    MEDICATIONS  (PRN):  acetaminophen     Tablet .. 650 milliGRAM(s) Oral every 6 hours PRN Temp greater or equal to 38C (100.4F), Mild Pain (1 - 3)          Xrays:  TLC:  OG:  ET tube:                                                                                       ECHO:  [facility] ICU:

## 2022-06-17 NOTE — PROGRESS NOTE ADULT - ASSESSMENT
72 year old male with PMHx of lung cancer (s/p right lobectomy and chemo in 2016), COPD (on 3L home O2), active smoker, HTN, CAD s/p cardiac stent, polio, GERD, presents to ED c/p SOB x4 days.    # SOB likely suspect COPD exacerbation 2/2 CAP  # Hx COPD on 3L NC (chronic) and Lung CA s/p lobectomy  -Status post right upper lobectomy.  -Chest CT scan shows New predominantly right middle lobe airspace consolidation with   additional bilateral centrilobular nodular opacities and medial right   lower lobe patchy opacity. Findings compatible with pneumonia in the   appropriate clinical setting.  - s/p Ceftriaxone/ Azithro, will continue  -IV steriod, and neb tx  -Continue with oxygen, and wean off as tolerated   -f/u urine for legionella, strep pna  -ID, and Pulmonary to follow up     # Hx Smoking  - quit 3 days ago  - encouraged nicotine cessation    # Cardiac Arrhythmia  - tele monitoring  - continue Metroprolol   - cardiology to follow up     # CAD s/p stent  # HTN  - not on ASA/plavix  - continue Metoprolol, Amlodipine     # GERD  - continue PPI    DVT/GI ppx      #Progress Note Handoff  Pending (specify):  as above   Family discussion:  plan of care was discussed with patient  in details.  all questions were answered.  seems to understand, and in agreement  Disposition:  unknown    
72 year old male with PMHx of lung cancer (s/p right lobectomy and chemo in 2016), COPD (on 3L home O2), active smoker, HTN, CAD s/p cardiac stent, polio, GERD, presents to ED c/p SOB x4 days.    # SOB likely suspect COPD exacerbation 2/2 CAP  # Hx COPD on 3L NC (chronic) and Lung CA s/p lobectomy  -Status post right upper lobectomy.  -Chest CT scan shows New predominantly right middle lobe airspace consolidation with   additional bilateral centrilobular nodular opacities and medial right   lower lobe patchy opacity. Findings compatible with pneumonia in the   appropriate clinical setting.  - s/p Ceftriaxone/ Azithro, will continue  -Switch to po steriod  -Continue with oxygen, and wean off as tolerated   -f/u urine for legionella, strep pna  -ID, and Pulmonary to follow up     # Hx Smoking  - quit 3 days ago  - encouraged nicotine cessation  -Counselled    # Cardiac Arrhythmia  - tele monitoring no arrythmia   - continue Metroprolol   - cardiology to follow up     # CAD s/p stent  # HTN  - not on ASA/plavix  - continue Metoprolol, Amlodipine     # GERD  - continue PPI    DVT/GI ppx      #Progress Note Handoff  Pending (specify):  as above .  anticipate for tomorrow  Family discussion:  plan of care was discussed with patient and daughter in details.  all questions were answered.  seems to understand, and in agreement  Disposition:  unknown    
72 year old male with PMHx of lung cancer (s/p right lobectomy and chemo in 2016), COPD (on 3L home O2), active smoker, HTN, CAD s/p cardiac stent, polio, GERD, presents to ED c/p SOB x4 days. Placed on bipap in ED. Pt evaluated at bedside. Admitted to telemetry. Radiology tests and hospital records, were reviewed, as well as previous notes on this patient.  Pt is off bipap now, on NC, speaks in full sentences but dyspneic when ambulating even on oxygen.    trop <0.01 x2  bnp 550 (no baseline)  procal 0.15 -> 0.12  ecg: sinus tachycardia with pvc, rbbb (no changes since may 2022)  ct chest: right middle lobe airspace consolidation with additional bilateral centrilobular nodular opacities compatible with pneumonia    - treat pna  - wheezing, needs nebs  - follow pulm/ID recommendations 
Impression   copd exacerbation   PNA gram negative ??    plan   office note reviewed   continue abx as per ID switch to oral   follow legionella   switch solumedrol to prednisone 60 mg and taper every 3 days by 20   follow with Dr champagne as outpatient    nebulizer Q4 hrs and prn   upon d/c need to be on laba/lama inhaled steroids such as trelegy or symbicort and spiriva 
72 year old male with PMHx of lung cancer (s/p right lobectomy and chemo in 2016), COPD (on 3L home O2), active smoker, HTN, CAD s/p cardiac stent, polio, GERD, presents to ED c/p SOB x4 days.    # SOB likely suspect COPD exacerbation 2/2 CAP  # Hx COPD on 3L NC (chronic) and Lung CA s/p lobectomy  -Status post right upper lobectomy.  -Chest CT scan shows New predominantly right middle lobe airspace consolidation with   additional bilateral centrilobular nodular opacities and medial right   lower lobe patchy opacity. Findings compatible with pneumonia in the   appropriate clinical setting.  - s/p Ceftriaxone/ Azithro, will continue  -IV steriod, and neb tx  -Continue with oxygen, and wean off as tolerated   -f/u urine for legionella, strep pna  -ID, and Pulmonary to follow up     # Hx Smoking  - quit 3 days ago  - encouraged nicotine cessation    # Cardiac Arrhythmia  - tele monitoring  - continue Metroprolol   - cardiology to follow up     # CAD s/p stent  # HTN  - not on ASA/plavix  - continue Metoprolol, Amlodipine     # GERD  - continue PPI    DVT/GI ppx      #Progress Note Handoff  Pending (specify):  as above   Family discussion:  plan of care was discussed with patient  in details.  all questions were answered.  seems to understand, and in agreement  Disposition:  unknown

## 2022-06-18 ENCOUNTER — TRANSCRIPTION ENCOUNTER (OUTPATIENT)
Age: 72
End: 2022-06-18

## 2022-06-18 VITALS — OXYGEN SATURATION: 92 %

## 2022-06-18 PROCEDURE — 99239 HOSP IP/OBS DSCHRG MGMT >30: CPT

## 2022-06-18 RX ORDER — METOPROLOL TARTRATE 50 MG
3 TABLET ORAL
Qty: 90 | Refills: 0
Start: 2022-06-18 | End: 2022-07-17

## 2022-06-18 RX ORDER — PIROXICAM 20 MG/1
1 CAPSULE ORAL
Qty: 0 | Refills: 0 | DISCHARGE

## 2022-06-18 RX ORDER — CEFPODOXIME PROXETIL 100 MG
1 TABLET ORAL
Qty: 10 | Refills: 0
Start: 2022-06-18 | End: 2022-06-22

## 2022-06-18 RX ORDER — AZITHROMYCIN 500 MG/1
1 TABLET, FILM COATED ORAL
Qty: 3 | Refills: 0
Start: 2022-06-18 | End: 2022-06-20

## 2022-06-18 RX ORDER — ACETAMINOPHEN 500 MG
2 TABLET ORAL
Qty: 0 | Refills: 0 | DISCHARGE
Start: 2022-06-18

## 2022-06-18 RX ORDER — METOPROLOL TARTRATE 50 MG
1 TABLET ORAL
Qty: 0 | Refills: 0 | DISCHARGE

## 2022-06-18 RX ORDER — IPRATROPIUM/ALBUTEROL SULFATE 18-103MCG
3 AEROSOL WITH ADAPTER (GRAM) INHALATION
Qty: 360 | Refills: 0
Start: 2022-06-18 | End: 2022-07-17

## 2022-06-18 RX ORDER — SENNA PLUS 8.6 MG/1
2 TABLET ORAL
Qty: 0 | Refills: 0 | DISCHARGE
Start: 2022-06-18

## 2022-06-18 RX ADMIN — PANTOPRAZOLE SODIUM 40 MILLIGRAM(S): 20 TABLET, DELAYED RELEASE ORAL at 06:05

## 2022-06-18 RX ADMIN — HEPARIN SODIUM 5000 UNIT(S): 5000 INJECTION INTRAVENOUS; SUBCUTANEOUS at 05:59

## 2022-06-18 RX ADMIN — Medication 500 MILLIGRAM(S): at 05:58

## 2022-06-18 RX ADMIN — Medication 500 MILLIGRAM(S): at 06:05

## 2022-06-18 RX ADMIN — Medication 60 MILLIGRAM(S): at 05:58

## 2022-06-18 RX ADMIN — Medication 75 MILLIGRAM(S): at 05:58

## 2022-06-18 RX ADMIN — AMLODIPINE BESYLATE 5 MILLIGRAM(S): 2.5 TABLET ORAL at 05:57

## 2022-06-18 RX ADMIN — Medication 3 MILLILITER(S): at 07:43

## 2022-06-18 RX ADMIN — BUDESONIDE AND FORMOTEROL FUMARATE DIHYDRATE 2 PUFF(S): 160; 4.5 AEROSOL RESPIRATORY (INHALATION) at 07:44

## 2022-06-18 NOTE — DISCHARGE NOTE PROVIDER - CARE PROVIDER_API CALL
Jarrett Cruz)  Critical Care Medicine; Internal Medicine; Pulmonary Disease  [address] [place] [address]  Phone: [phone]  Fax: [phone]  Follow Up Time: 1-3 days   Jarrett Cruz)  Critical Care Medicine; Internal Medicine; Pulmonary Disease  [address] [facility], [address]  [place] [address]  Phone: [phone]  Fax: [phone]  Follow Up Time: 1-3 days    Olivier Singletary)  Cardiovascular Disease; Internal Medicine  [address] [place] [address]  Phone: [phone]  Fax: [phone]  Follow Up Time: 1 week

## 2022-06-18 NOTE — DISCHARGE NOTE PROVIDER - NSDCCPCAREPLAN_GEN_ALL_CORE_FT
PRINCIPAL DISCHARGE DIAGNOSIS  Diagnosis: Pneumonia  Assessment and Plan of Treatment: continue with current antibiotic, and neb treatment.  Outpatient follow up with pulmonaryi n 2-3 days      SECONDARY DISCHARGE DIAGNOSES  Diagnosis: COPD exacerbation  Assessment and Plan of Treatment: Continue with steriod taper, and inhalers  outpatient follow up with pulmonary in 2-3 days

## 2022-06-18 NOTE — DISCHARGE NOTE PROVIDER - NSDCMRMEDTOKEN_GEN_ALL_CORE_FT
acetaminophen 325 mg oral tablet: 2 tab(s) orally every 6 hours, As needed, Temp greater or equal to 38C (100.4F), Mild Pain (1 - 3)  amLODIPine 5 mg oral tablet: 1 tab(s) orally once a day  azithromycin 500 mg oral tablet: 1 tab(s) orally once a day   cefpodoxime 200 mg oral tablet: 1 tab(s) orally every 12 hours   ipratropium-albuterol 0.5 mg-2.5 mg/3 mL inhalation solution: 3 milliliter(s) inhaled every 6 hours  metoprolol succinate 25 mg oral tablet, extended release: 3 tab(s) orally once a day  NexIUM 24HR 20 mg oral delayed release capsule: 1 cap(s) orally once a day  predniSONE 10 mg oral tablet: 6 tab(s) orally once a day x 3 days  decrease by one every 3 days  senna oral tablet: 2 tab(s) orally once a day (at bedtime)  traZODone 150 mg oral tablet: orally once a day  Trelegy Ellipta 100 mcg-62.5 mcg-25 mcg/inh inhalation powder: 1 puff(s) inhaled once a day

## 2022-06-18 NOTE — DISCHARGE NOTE PROVIDER - PROVIDER TOKENS
PROVIDER:[TOKEN:[[id number]],FOLLOWUP:[1-3 days]] PROVIDER:[TOKEN:[[id number]],FOLLOWUP:[1-3 days]],PROVIDER:[TOKEN:[[id number]],FOLLOWUP:[1 week]]

## 2022-06-18 NOTE — DISCHARGE NOTE NURSING/CASE MANAGEMENT/SOCIAL WORK - NSDCPEFALRISK_GEN_ALL_CORE
For information on Fall & Injury Prevention, visit: [email or web address] OR  [email or web address] OR  https://www.cdc.gov/steadi/patient.html

## 2022-06-18 NOTE — DISCHARGE NOTE PROVIDER - CARE PROVIDERS DIRECT ADDRESSES
,DirectAddress_Unknown ,DirectAddress_Unknown,[email or web address]

## 2022-06-18 NOTE — DISCHARGE NOTE NURSING/CASE MANAGEMENT/SOCIAL WORK - NSDCFUADDAPPT_GEN_ALL_CORE_FT
Please follow up with your doctor, cardiology, and pulmonary In one week  Please come back to the hospital if you developed any new symptoms or concerns  continue with home oxygen 3 liters at all times

## 2022-06-18 NOTE — DISCHARGE NOTE PROVIDER - NSDCFUSCHEDAPPT_GEN_ALL_CORE_FT
Anthony Lockwood  [facility]  ONCNEUR[address]O [address]  Scheduled Appointment: 07/08/2022    [facility]  CARDIOLOGY [address]  Scheduled Appointment: 07/27/2022    Olivier Singletary  [facility]  CARDIOLOGY [address]  Scheduled Appointment: 08/23/2022    Cortes Becker  [facility]  PULMMED [address]  Scheduled Appointment: 08/29/2022

## 2022-06-18 NOTE — DISCHARGE NOTE NURSING/CASE MANAGEMENT/SOCIAL WORK - PATIENT PORTAL LINK FT
You can access the FollowMyHealth Patient Portal offered by [facility] by registering at the following website: [email or web address]. By joining [employer]’s FollowMyHealth portal, you will also be able to view your health information using other applications (apps) compatible with our system.

## 2022-06-18 NOTE — DISCHARGE NOTE PROVIDER - HOSPITAL COURSE
72 year old male with PMHx of lung cancer (s/p right lobectomy and chemo in 2016), COPD (on 3L home O2), active smoker, HTN, CAD s/p cardiac stent, polio, GERD, presents to ED c/p SOB x4 days.    # SOB likely suspect COPD exacerbation 2/2 CAP  # Hx COPD on 3L NC (chronic) and Lung CA s/p lobectomy  -Status post right upper lobectomy.  -Chest CT scan shows New predominantly right middle lobe airspace consolidation with   additional bilateral centrilobular nodular opacities and medial right   lower lobe patchy opacity. Findings compatible with pneumonia in the   appropriate clinical setting.  - s/p Ceftriaxone/ Azithro, will continue  -continue with po steriod  -Continue with oxygen.  SPO2 is 96% on 3 liters with ambulation, and 93% on ra at rest.  ambulating well.    -Outpatient follow up with pulmonary       # Hx Smoking  - quit 3 days ago  - encouraged nicotine cessation  -Counselled    # Cardiac Arrhythmia  - tele monitoring no arrythmia   - continue Metroprolol   - cardiology outpatient follow up     # CAD s/p stent  # HTN  - not on ASA/plavix  - continue Metoprolol, Amlodipine     # GERD  - continue PPI  Patient was seen and examined today  feels better   SOB resolved.  wants to go home  Offers no other complaints  Constitutional: No fever, fatigue or weight loss.  Skin: No rash.  Eyes: No recent vision problems or eye pain.  ENT: No congestion, ear pain, or sore throat.  Endocrine: No thyroid problems.  Cardiovascular: No chest pain or palpation.  Respiratory: No cough, shortness of breath, congestion, or wheezing.  Gastrointestinal: No abdominal pain, nausea, vomiting, or diarrhea.  Genitourinary: No dysuria.  Musculoskeletal: No joint swelling.  Neurologic: No headache.  Vital Signs Last 24 Hrs  T(C): 35.8 (06-18-22 @ 05:00), Max: 36.3 (06-17-22 @ 14:01)  T(F): 96.5 (06-18-22 @ 05:00), Max: 97.3 (06-17-22 @ 14:01)  HR: 68 (06-18-22 @ 05:00) (63 - 76)  BP: 139/64 (06-18-22 @ 05:00) (131/70 - 159/72)  BP(mean): --  RR: 18 (06-18-22 @ 05:00) (16 - 18)  SpO2: 92% (06-18-22 @ 10:14) (92% - 96%)  PHYSICAL EXAM-  GENERAL: NAD   HEAD:  Atraumatic, Normocephalic  EYES: EOMI, PERRLA, conjunctiva and sclera clear  NECK: Supple, No JVD, Normal thyroid  NERVOUS SYSTEM:  Alert & Oriented X3, Moving all extremities  CHEST/LUNG: Clear to percussion bilaterally; No rales, rhonchi, wheezing, or rubs  HEART: Regular rate and rhythm; No murmurs, rubs, or gallops  ABDOMEN: Soft, Nontender, Nondistended; Bowel sounds present  EXTREMITIES:   No clubbing, cyanosis, or edema  SKIN: No rashes or lesions  high risk for readmission   Hospital course, and discharge planning were discussed with patient, and family in details.  all questions were answered.  seems to understand, and in agreement.  time 70 min

## 2022-06-21 LAB
CULTURE RESULTS: SIGNIFICANT CHANGE UP
CULTURE RESULTS: SIGNIFICANT CHANGE UP
SPECIMEN SOURCE: SIGNIFICANT CHANGE UP
SPECIMEN SOURCE: SIGNIFICANT CHANGE UP

## 2022-06-22 NOTE — CDI QUERY NOTE - NSCDIOTHERTXTBX2_GEN_ALL_CORE_FT
Clinical Indicators     6/14 ED ADULT Triage Note: · Chief Complaint Quote: BIBA from home as per EMS  pt. c/o shortness of breath x4 days, pt. hx of COPD. EMS gave 3 Combivent  treatments and 12 mg IM of Decadron; /93      RR 34   Temp 36.4    SpO2 97 % on 10 L/m per venti mask    6/14 ED Provider Note: … RESP: +end expiratory wheezing with diminished breath  sounds b/l. tachypneic with accessory muscle use; … placed on BiPAP. Respiratory  effort improved; is in respiratory distress in a tripod position hypoxic despite  on nonrebreather upon initial evaluation; … has diffuse wheezing increased work  of breathing accessory muscle use noted sinus tachycardia; … upon reevaluation  after approximately 40 minutes the patient is made significant improvement  respiratory distress is near about resolved; ...    6/15 Progress Note Adult-Hospitalist Attending: ... SOB likely suspect COPD  exacerbation 2/2 CAP; Hx COPD on 3L NC (chronic) and Lung CA s/p lobectomy    Query     Based on your clinical judgment and consideration of these clinical indicators,  please clarify if initial SpO2 97% on 10L/m venti mask and COPD on 3L NC  (chronic) can be further specified as:  *Suspected acute on chronic hypoxic respiratory failure associated with COPD  exacerbation could not be ruled out at the time of discharge  *Suspected chronic hypoxic respiratory failure associated with COPD exacerbation  could not be ruled out at the time of discharge  *Suspected hypoxia without respiratory failure associated with COPD exacerbation  could not be ruled out at the time of discharge  *Other (please specify)   *Unable to clinically further specify SpO2 97% on 10L/m venti mask and COPD on  3L NC (chronic)    Thank you,  Carolina Welsh  [phone]

## 2022-06-22 NOTE — CDI QUERY NOTE - NSCDIOTHERTXTBX_GEN_ALL_CORE_HH
Clinical Indicators    6/14 ED ADULT Triage Note: /93      RR 34   Temp 36.4   SpO2 97 % on  10 L/m per venti ask    6/14 H&P Adult: … Findings compatible with pneumonia in the appropriate clinical  setting; # SOB likely suspect COPD exacerbation 2/2 CAP # Hx COPD on 3L NC and  Lung CA s/p lobectomy    6/15 Consult Note Adult-Infectious Disease Attending: # Sepsis (Tachycardia +  Leukocytosis) # Pneumonia; Continue Ceftriaxone and Azithromycin until work up  is done    Laboratory: 6/14 WBC 16.49; Procalcitonin 0.15     Xray Chest 1 View- PORTABLE-Urgent (05.17.22 @ 16:36) Impression: Right lung  opacities -see same day chest CT for detailed evaluation.    Medication: azithromycin IVPB 500 milligram(s) IV Intermittent every 24 hours;  ceftriaxone   IVPB 1000 milligram(s) IV Intermittent every 24 hours    Query     Based on your professional judgment and consideration of these clinical indicators,  please clarify if sepsis can be further specified as:  *Sepsis was evaluated and confirmed by Infectious Disease   *Sepsis was evaluated and ruled out    *Other (please specify)   *Unable to clinically further specify if sepsis was ruled in or ruled out

## 2022-06-29 NOTE — PATIENT PROFILE ADULT - STATED REASON FOR ADMISSION
Call 911 for stroke/Need for follow up after discharge/Prescribed medications/Risk factors for stroke/Stroke education booklet/Stroke support groups for patients, families, and friends/Stroke warning signs and symptoms/Signs and symptoms of stroke Pt states he could not breathe so he came to ER.

## 2022-07-06 ENCOUNTER — APPOINTMENT (OUTPATIENT)
Dept: CARDIOLOGY | Facility: CLINIC | Age: 72
End: 2022-07-06

## 2022-07-06 PROCEDURE — 93228 REMOTE 30 DAY ECG REV/REPORT: CPT

## 2022-07-08 ENCOUNTER — APPOINTMENT (OUTPATIENT)
Dept: NEUROLOGY | Facility: CLINIC | Age: 72
End: 2022-07-08

## 2022-07-08 PROCEDURE — 95912 NRV CNDJ TEST 11-12 STUDIES: CPT

## 2022-07-08 PROCEDURE — 95886 MUSC TEST DONE W/N TEST COMP: CPT

## 2022-07-12 ENCOUNTER — APPOINTMENT (OUTPATIENT)
Age: 72
End: 2022-07-12

## 2022-07-12 VITALS
DIASTOLIC BLOOD PRESSURE: 70 MMHG | HEIGHT: 69 IN | SYSTOLIC BLOOD PRESSURE: 120 MMHG | HEART RATE: 85 BPM | BODY MASS INDEX: 24.44 KG/M2 | WEIGHT: 165 LBS | OXYGEN SATURATION: 95 % | RESPIRATION RATE: 14 BRPM

## 2022-07-12 DIAGNOSIS — J15.9 UNSPECIFIED BACTERIAL PNEUMONIA: ICD-10-CM

## 2022-07-12 PROCEDURE — 99214 OFFICE O/P EST MOD 30 MIN: CPT | Mod: 25

## 2022-07-12 PROCEDURE — 71046 X-RAY EXAM CHEST 2 VIEWS: CPT

## 2022-07-12 NOTE — END OF VISIT
[] : Fellow [FreeTextEntry3] : EVENTS NOTED, COPD ON TRELEGY STOPPED SMOKING FEW WEEKS AGO, LUNG CANCER SP RESECTION 2016, SP RECENT PNA BETTER CHEST CT IN 12 WEEKS

## 2022-07-12 NOTE — HISTORY OF PRESENT ILLNESS
[TextBox_4] : 72 y.o. male w/ PMHx of COPD, Lung nodule, Lung Ca (s/p right lobectomy in 2016 with chemotherapy, then cancer recurrence with RT in 2017) presents for follow up visit. Patient was last seen on 06/02 and at that time was prescribed trellegy. Patient states that his shortness of breath has improved but he still gets winded after a flight of stairs and still feels he walks a little slower than his peers. \par \par Of note, patient recently developed pneumonia in 06/2022 and was in hospital. Since then quit smoking. \par \par

## 2022-07-12 NOTE — ASSESSMENT
[FreeTextEntry1] : 72 y.o. male w/ PMHx of COPD, Lung nodule, Lung Ca (s/p right lobectomy in 2016 with chemotherapy, then cancer recurrence with RT in 2017) presents for follow up visit. \par \par #) COPD\par -continue with trelegy, triple therapy\par -rescue inhaler PRN\par -PFT's to be planned\par \par #) Lung nodule\par -repeat CT Chest in 3 months\par \par \par -RTC in 3 months after CT Chest

## 2022-07-27 ENCOUNTER — APPOINTMENT (OUTPATIENT)
Dept: CARDIOLOGY | Facility: CLINIC | Age: 72
End: 2022-07-27

## 2022-07-27 PROCEDURE — 93306 TTE W/DOPPLER COMPLETE: CPT

## 2022-08-23 ENCOUNTER — APPOINTMENT (OUTPATIENT)
Dept: CARDIOLOGY | Facility: CLINIC | Age: 72
End: 2022-08-23

## 2022-08-23 VITALS
DIASTOLIC BLOOD PRESSURE: 70 MMHG | BODY MASS INDEX: 26.72 KG/M2 | SYSTOLIC BLOOD PRESSURE: 119 MMHG | HEIGHT: 69 IN | HEART RATE: 71 BPM | WEIGHT: 180.4 LBS

## 2022-08-23 DIAGNOSIS — A80.9 ACUTE POLIOMYELITIS, UNSPECIFIED: ICD-10-CM

## 2022-08-23 PROCEDURE — 99214 OFFICE O/P EST MOD 30 MIN: CPT

## 2022-08-23 NOTE — HISTORY OF PRESENT ILLNESS
[FreeTextEntry1] : 72 MI age 40. He had cardiac stent [place] about 2015. He had lobectomy for lung ca 2015. He got chemo and later recurrence and got radiation. Getting palpitations. He had sinus tach. Now better on Metoprolol xl 50. He denies sob. He gets garcia. Occasional chest pain

## 2022-08-23 NOTE — DISCUSSION/SUMMARY
[FreeTextEntry1] : 72 MI age 40. He had cardiac stent [place] about 2015. He had lobectomy for lung ca 2015. He got chemo and later recurrence and got radiation. Getting palpitations. He had sinus tach. Now better on Metoprolol xl 50. He denies sob. He gets garcia. Now at times chest pain.  He is on home o2\par He needs a Lexiscan. He needs a echo.. MCOT 26 days. One episode afib less than 10 sec. Echo 7/22 EF 51%. He did not do lexiscan. Told  do lexiscan .

## 2022-08-23 NOTE — REASON FOR VISIT
[Symptom and Test Evaluation] : symptom and test evaluation [Coronary Artery Disease] : coronary artery disease operating room

## 2022-08-23 NOTE — PHYSICAL EXAM
[Well Developed] : well developed [Well Nourished] : well nourished [No Acute Distress] : no acute distress [Normal Conjunctiva] : normal conjunctiva [Normal Venous Pressure] : normal venous pressure [No Carotid Bruit] : no carotid bruit [Rhythm Regular] : regular [Normal S1] : normal S1 [Normal S2] : normal S2 [S3] : no S3 [S4] : no S4 [No Murmur] : no murmurs heard [Normal Rate] : the respiratory rate was normal [Decreased Breath Sounds] : breath sounds were decreased diffusely [Rhonchi Bilateral] : rhonchi were heard diffusely over both lungs [Soft] : abdomen soft [Normal Gait] : normal gait [No Edema] : no edema [No Rash] : no rash [No Focal Deficits] : no focal deficits [Alert and Oriented] : alert and oriented

## 2022-09-16 ENCOUNTER — APPOINTMENT (OUTPATIENT)
Age: 72
End: 2022-09-16

## 2022-09-16 VITALS
WEIGHT: 175 LBS | HEIGHT: 69 IN | OXYGEN SATURATION: 91 % | HEART RATE: 72 BPM | SYSTOLIC BLOOD PRESSURE: 122 MMHG | DIASTOLIC BLOOD PRESSURE: 74 MMHG | BODY MASS INDEX: 25.92 KG/M2 | RESPIRATION RATE: 14 BRPM

## 2022-09-16 PROCEDURE — 99214 OFFICE O/P EST MOD 30 MIN: CPT

## 2022-09-16 NOTE — REASON FOR VISIT
[Follow-Up] : a follow-up visit [Lung Cancer] : lung cancer [COPD] : COPD [Shortness of Breath] : shortness of breath

## 2022-09-16 NOTE — DISCUSSION/SUMMARY
[FreeTextEntry1] : COPD EXACERBATION/ ACTIVE SMOKER\par PREDNISONE\par \par SOB/ LUNG CANCER REPEAT CT \par SMOKING COUNSELING

## 2022-09-16 NOTE — HISTORY OF PRESENT ILLNESS
[TextBox_4] : SOB ON MINIMAL EXERTION, RUN OUT OF TRELEGY\par STILL ACTIVELY SMOKING\par CHEST CT 6/22

## 2022-09-22 NOTE — ED PROVIDER NOTE - NS ED MD DISPO DIVISION
[facility] [place] [place])  Family Medicine Outpatient    Obgyn: Dr. Demar Christie:  CC: had concerns including Lower Back Pain (Pt c/o lower back pain for the past two weeks. ). HPI:  Magnolia Rodriguez is a female 25 y.o. presented to the clinic for low back pain for the past 2 weeks. She reports her  came home from [facility] approximately a month ago. He is wheelchair-bound and has open wounds and sores that need tended to. She states that her home health aide is no showing a lot. She has had to do a lot more work and lift him along with bathing etc.  She has used over-the-counter analgesics without significant relief. She reports like a tightening/throbbing in her lower back on the sides of her spine. She denies any falls or other traumas. Review of Systems   Constitutional:  Negative for appetite change, fatigue and fever. Respiratory:  Negative for cough, shortness of breath and wheezing. Cardiovascular:  Negative for chest pain and palpitations. Gastrointestinal:  Negative for abdominal pain, constipation, diarrhea, nausea and vomiting. Musculoskeletal:  Positive for back pain. Negative for gait problem and neck pain. Outpatient Medications Marked as Taking for the 9/22/22 encounter (Office Visit) with James Walsh MD   Medication Sig Dispense Refill    tiZANidine (ZANAFLEX) 2 MG tablet Take 1 tablet by mouth 3 times daily as needed (muscle spasm) 30 tablet 0    methylPREDNISolone (MEDROL DOSEPACK) 4 MG tablet Take by mouth. 1 kit 0    divalproex (DEPAKOTE) 250 MG DR tablet take 1 tablet by mouth twice a day      lamoTRIgine (LAMICTAL) 100 MG tablet Take 100 mg by mouth daily      Magnesium Oxide (MAGNESIUM-OXIDE) 250 MG TABS tablet Take 1 tablet by mouth daily 30 tablet 0    traZODone (DESYREL) 100 MG tablet Take 1 tablet by mouth at bedtime      clonazePAM (KLONOPIN) 0.5 MG tablet Take 1 tablet by mouth 2 times daily as needed for Anxiety.          I have reviewed all pertinent PMHx, PSHx, FamHx, SocialHx, medications, and allergies and updated history as appropriate. OBJECTIVE    VS: /76   Pulse 77   Temp 98.8 °F (37.1 °C)   Resp 16   Ht 5' 5\" (1.651 m)   Wt 263 lb (119.3 kg)   SpO2 98%   BMI 43.77 kg/m²   Physical Exam  Constitutional:       General: She is not in acute distress. Appearance: She is well-developed. She is not diaphoretic. HENT:      Head: Normocephalic and atraumatic. Eyes:      Conjunctiva/sclera: Conjunctivae normal.      Pupils: Pupils are equal, round, and reactive to light. Cardiovascular:      Rate and Rhythm: Normal rate and regular rhythm. Pulmonary:      Effort: Pulmonary effort is normal.      Breath sounds: Normal breath sounds. Abdominal:      General: Bowel sounds are normal. There is no distension. Palpations: Abdomen is soft. Tenderness: There is no abdominal tenderness. Hernia: No hernia is present. Musculoskeletal:         General: Tenderness (Lower paraspinal muscles. Pain with flexion.) present. Normal range of motion. Cervical back: Normal range of motion and neck supple. Skin:     General: Skin is warm and dry. Neurological:      Mental Status: She is alert and oriented to person, place, and time. Motor: No weakness. ASSESSMENT/PLAN:  1. Acute bilateral low back pain, unspecified whether sciatica present  Exercises provided for patient. TENS unit ordered. Steroid burst and Zanaflex sent to pharmacy. Kenalog x1 given in office today. - tiZANidine (ZANAFLEX) 2 MG tablet; Take 1 tablet by mouth 3 times daily as needed (muscle spasm)  Dispense: 30 tablet; Refill: 0  - DME Order for (Specify) as OP  - methylPREDNISolone (MEDROL DOSEPACK) 4 MG tablet; Take by mouth. Dispense: 1 kit; Refill: 0    2. Vitamin D deficiency  - CBC; Future  - Comprehensive Metabolic Panel; Future  - Vitamin D 25 Hydroxy; Future  - Magnesium;  Future    I have reviewed my findings and recommendations with Flori Vargas MD  9/28/2022 3:16 PM  Return in about 4 weeks (around 10/20/2022). Counseled regarding above diagnosis, including possible risks and complications, especially if left uncontrolled. Patient counseled on red flag symptoms and if they occur to go to the ED. Discussed medications risk/benefits and possible side effects and alternatives to treatment. Patient and/or guardian verbalizes understanding, agrees, feels comfortable with and wishes to proceed with above treatment plan. Advised patient regarding importance of keeping up with recommended health maintenance and to schedule as soon as possible if overdue, as this is important in assessing for undiagnosed pathology, especially cancer, as well as protecting against potentially harmful/life threatening disease. Patient and/or guardian verbalizes understanding and agrees with above counseling, assessment and plan. All questions answered. Please note this report has been partially produced using speech recognition software  and may contain errors related to that system including grammar, punctuation and spelling as well as words and phrases that may seem inappropriate. If there are questions or concerns please feel free to contact me to clarify. [facility]

## 2022-11-29 ENCOUNTER — APPOINTMENT (OUTPATIENT)
Dept: CARDIOLOGY | Facility: CLINIC | Age: 72
End: 2022-11-29

## 2022-12-16 ENCOUNTER — APPOINTMENT (OUTPATIENT)
Age: 72
End: 2022-12-16

## 2023-01-21 ENCOUNTER — EMERGENCY (EMERGENCY)
Facility: HOSPITAL | Age: 73
LOS: 0 days | Discharge: HOME | End: 2023-01-21
Attending: EMERGENCY MEDICINE | Admitting: EMERGENCY MEDICINE
Payer: COMMERCIAL

## 2023-01-21 VITALS
RESPIRATION RATE: 18 BRPM | DIASTOLIC BLOOD PRESSURE: 74 MMHG | HEART RATE: 118 BPM | SYSTOLIC BLOOD PRESSURE: 123 MMHG | OXYGEN SATURATION: 98 % | TEMPERATURE: 97 F

## 2023-01-21 VITALS — HEART RATE: 97 BPM
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
## 2023-01-21 DIAGNOSIS — Z95.5 PRESENCE OF CORONARY ANGIOPLASTY IMPLANT AND GRAFT: Chronic | ICD-10-CM

## 2023-01-21 DIAGNOSIS — T20.24XA BURN OF SECOND DEGREE OF NOSE (SEPTUM), INITIAL ENCOUNTER: ICD-10-CM

## 2023-01-21 DIAGNOSIS — T20.26XA BURN OF SECOND DEGREE OF FOREHEAD AND CHEEK, INITIAL ENCOUNTER: ICD-10-CM

## 2023-01-21 DIAGNOSIS — R06.2 WHEEZING: ICD-10-CM
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
## 2023-01-21 DIAGNOSIS — Z98.890 OTHER SPECIFIED POSTPROCEDURAL STATES: Chronic | ICD-10-CM

## 2023-01-21 DIAGNOSIS — R00.0 TACHYCARDIA, UNSPECIFIED: ICD-10-CM

## 2023-01-21 DIAGNOSIS — K21.9 GASTRO-ESOPHAGEAL REFLUX DISEASE WITHOUT ESOPHAGITIS: ICD-10-CM

## 2023-01-21 DIAGNOSIS — F17.200 NICOTINE DEPENDENCE, UNSPECIFIED, UNCOMPLICATED: ICD-10-CM

## 2023-01-21 DIAGNOSIS — H26.9 UNSPECIFIED CATARACT: Chronic | ICD-10-CM

## 2023-01-21 DIAGNOSIS — J44.9 CHRONIC OBSTRUCTIVE PULMONARY DISEASE, UNSPECIFIED: ICD-10-CM

## 2023-01-21 DIAGNOSIS — I25.10 ATHEROSCLEROTIC HEART DISEASE OF NATIVE CORONARY ARTERY WITHOUT ANGINA PECTORIS: ICD-10-CM

## 2023-01-21 DIAGNOSIS — I45.10 UNSPECIFIED RIGHT BUNDLE-BRANCH BLOCK: ICD-10-CM

## 2023-01-21 DIAGNOSIS — T44.7X6A UNDERDOSING OF BETA-ADRENORECEPTOR ANTAGONISTS, INITIAL ENCOUNTER: ICD-10-CM
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
## 2023-01-21 DIAGNOSIS — Z86.12 PERSONAL HISTORY OF POLIOMYELITIS: ICD-10-CM

## 2023-01-21 DIAGNOSIS — Z85.118 PERSONAL HISTORY OF OTHER MALIGNANT NEOPLASM OF BRONCHUS AND LUNG: ICD-10-CM

## 2023-01-21 DIAGNOSIS — Z90.49 ACQUIRED ABSENCE OF OTHER SPECIFIED PARTS OF DIGESTIVE TRACT: Chronic | ICD-10-CM

## 2023-01-21 DIAGNOSIS — Z99.81 DEPENDENCE ON SUPPLEMENTAL OXYGEN: ICD-10-CM

## 2023-01-21 DIAGNOSIS — Z90.2 ACQUIRED ABSENCE OF LUNG [PART OF]: Chronic | ICD-10-CM

## 2023-01-21 DIAGNOSIS — Z91.138 PATIENT'S UNINTENTIONAL UNDERDOSING OF MEDICATION REGIMEN FOR OTHER REASON: ICD-10-CM

## 2023-01-21 DIAGNOSIS — T31.0 BURNS INVOLVING LESS THAN 10% OF BODY SURFACE: ICD-10-CM

## 2023-01-21 DIAGNOSIS — R00.2 PALPITATIONS: ICD-10-CM

## 2023-01-21 LAB
ALBUMIN SERPL ELPH-MCNC: 4.1 G/DL — SIGNIFICANT CHANGE UP (ref 3.5–5.2)
ALP SERPL-CCNC: 106 U/L — SIGNIFICANT CHANGE UP (ref 30–115)
ALT FLD-CCNC: 7 U/L — SIGNIFICANT CHANGE UP (ref 0–41)
ANION GAP SERPL CALC-SCNC: 10 MMOL/L — SIGNIFICANT CHANGE UP (ref 7–14)
AST SERPL-CCNC: 10 U/L — SIGNIFICANT CHANGE UP (ref 0–41)
BASOPHILS # BLD AUTO: 0.07 K/UL — SIGNIFICANT CHANGE UP (ref 0–0.2)
BASOPHILS NFR BLD AUTO: 0.6 % — SIGNIFICANT CHANGE UP (ref 0–1)
BILIRUB SERPL-MCNC: 0.7 MG/DL — SIGNIFICANT CHANGE UP (ref 0.2–1.2)
BUN SERPL-MCNC: 23 MG/DL — HIGH (ref 10–20)
CALCIUM SERPL-MCNC: 9.3 MG/DL — SIGNIFICANT CHANGE UP (ref 8.4–10.5)
CHLORIDE SERPL-SCNC: 101 MMOL/L — SIGNIFICANT CHANGE UP (ref 98–110)
CO2 SERPL-SCNC: 30 MMOL/L — SIGNIFICANT CHANGE UP (ref 17–32)
CREAT SERPL-MCNC: 0.8 MG/DL — SIGNIFICANT CHANGE UP (ref 0.7–1.5)
EGFR: 93 ML/MIN/1.73M2 — SIGNIFICANT CHANGE UP
EOSINOPHIL # BLD AUTO: 0.11 K/UL — SIGNIFICANT CHANGE UP (ref 0–0.7)
EOSINOPHIL NFR BLD AUTO: 1 % — SIGNIFICANT CHANGE UP (ref 0–8)
GLUCOSE SERPL-MCNC: 111 MG/DL — HIGH (ref 70–99)
HCT VFR BLD CALC: 47 % — SIGNIFICANT CHANGE UP (ref 42–52)
HGB BLD-MCNC: 15.2 G/DL — SIGNIFICANT CHANGE UP (ref 14–18)
IMM GRANULOCYTES NFR BLD AUTO: 0.4 % — HIGH (ref 0.1–0.3)
LYMPHOCYTES # BLD AUTO: 1.3 K/UL — SIGNIFICANT CHANGE UP (ref 1.2–3.4)
LYMPHOCYTES # BLD AUTO: 12 % — LOW (ref 20.5–51.1)
MCHC RBC-ENTMCNC: 29.3 PG — SIGNIFICANT CHANGE UP (ref 27–31)
MCHC RBC-ENTMCNC: 32.3 G/DL — SIGNIFICANT CHANGE UP (ref 32–37)
MCV RBC AUTO: 90.6 FL — SIGNIFICANT CHANGE UP (ref 80–94)
MONOCYTES # BLD AUTO: 0.98 K/UL — HIGH (ref 0.1–0.6)
MONOCYTES NFR BLD AUTO: 9.1 % — SIGNIFICANT CHANGE UP (ref 1.7–9.3)
NEUTROPHILS # BLD AUTO: 8.29 K/UL — HIGH (ref 1.4–6.5)
NEUTROPHILS NFR BLD AUTO: 76.9 % — HIGH (ref 42.2–75.2)
NRBC # BLD: 0 /100 WBCS — SIGNIFICANT CHANGE UP (ref 0–0)
NT-PROBNP SERPL-SCNC: 183 PG/ML — SIGNIFICANT CHANGE UP (ref 0–300)
PLATELET # BLD AUTO: 187 K/UL — SIGNIFICANT CHANGE UP (ref 130–400)
POTASSIUM SERPL-MCNC: 3.9 MMOL/L — SIGNIFICANT CHANGE UP (ref 3.5–5)
POTASSIUM SERPL-SCNC: 3.9 MMOL/L — SIGNIFICANT CHANGE UP (ref 3.5–5)
PROT SERPL-MCNC: 6.4 G/DL — SIGNIFICANT CHANGE UP (ref 6–8)
RBC # BLD: 5.19 M/UL — SIGNIFICANT CHANGE UP (ref 4.7–6.1)
RBC # FLD: 14.3 % — SIGNIFICANT CHANGE UP (ref 11.5–14.5)
SODIUM SERPL-SCNC: 141 MMOL/L — SIGNIFICANT CHANGE UP (ref 135–146)
TROPONIN T SERPL-MCNC: <0.01 NG/ML — SIGNIFICANT CHANGE UP
WBC # BLD: 10.79 K/UL — SIGNIFICANT CHANGE UP (ref 4.8–10.8)
WBC # FLD AUTO: 10.79 K/UL — SIGNIFICANT CHANGE UP (ref 4.8–10.8)

## 2023-01-21 PROCEDURE — 93010 ELECTROCARDIOGRAM REPORT: CPT

## 2023-01-21 PROCEDURE — 71046 X-RAY EXAM CHEST 2 VIEWS: CPT | Mod: 26

## 2023-01-21 PROCEDURE — 99285 EMERGENCY DEPT VISIT HI MDM: CPT | Mod: GC

## 2023-01-21 RX ORDER — METOPROLOL TARTRATE 50 MG
2.5 TABLET ORAL ONCE
Refills: 0 | Status: COMPLETED | OUTPATIENT
Start: 2023-01-21 | End: 2023-01-21

## 2023-01-21 RX ORDER — ACETAMINOPHEN 500 MG
975 TABLET ORAL ONCE
Refills: 0 | Status: COMPLETED | OUTPATIENT
Start: 2023-01-21 | End: 2023-01-21

## 2023-01-21 RX ORDER — METOPROLOL TARTRATE 50 MG
1 TABLET ORAL
Qty: 7 | Refills: 0
Start: 2023-01-21 | End: 2023-01-27

## 2023-01-21 RX ADMIN — Medication 2.5 MILLIGRAM(S): at 14:31

## 2023-01-21 RX ADMIN — Medication 975 MILLIGRAM(S): at 17:24
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
## 2023-01-21 NOTE — ED ADULT NURSE NOTE - NSIMPLEMENTINTERV_GEN_ALL_ED
Implemented All Universal Safety Interventions:  [place] to call system. Call bell, personal items and telephone within reach. Instruct patient to call for assistance. Room bathroom lighting operational. Non-slip footwear when patient is off stretcher. Physically safe environment: no spills, clutter or unnecessary equipment. Stretcher in lowest position, wheels locked, appropriate side rails in place.
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
## 2023-01-21 NOTE — ED PROVIDER NOTE - PATIENT PORTAL LINK FT
You can access the FollowMyHealth Patient Portal offered by [facility] by registering at the following website: [email or web address]. By joining [employer]’s FollowMyHealth portal, you will also be able to view your health information using other applications (apps) compatible with our system.

## 2023-01-21 NOTE — ED PROVIDER NOTE - PROGRESS NOTE DETAILS
Resident AO: Burn consulted. Resident AO: Burn advised for outpatient follow up with bacitracin topically.  Sent 7 days of metoprolol to patient's pharmacy until next refill.
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
## 2023-01-21 NOTE — ED PROVIDER NOTE - NSFOLLOWUPCLINICS_GEN_ALL_ED_FT
[facility]  Burn  [address], [address]  [place] [address]  Phone: [phone]  Fax:
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
## 2023-01-31 NOTE — PATIENT PROFILE ADULT - PRIMARY SOURCE OF SUPPORT/COMFORT
[FreeTextEntry1] : I, Jessica Conner, acted as a scribe on behalf of Dr. Damian Mcclelland MD, on 01/31/2023. \par \par All medical entries made by the scribe were at my, Dr. Damian Mcclelland MD, direction and personally dictated by me on 01/31/2023. I have reviewed the chart and agree that the record accurately reflects my personal performance of the history, physical exam, assessment and plan. I have also personally directed, reviewed, and agreed with the chart. 
child(quinton)/significant other

## 2023-02-01 ENCOUNTER — APPOINTMENT (OUTPATIENT)
Dept: CARDIOLOGY | Facility: CLINIC | Age: 73
End: 2023-02-01
Payer: COMMERCIAL

## 2023-02-01 VITALS
HEIGHT: 69 IN | BODY MASS INDEX: 24.73 KG/M2 | DIASTOLIC BLOOD PRESSURE: 78 MMHG | SYSTOLIC BLOOD PRESSURE: 136 MMHG | HEART RATE: 80 BPM | OXYGEN SATURATION: 94 % | WEIGHT: 167 LBS

## 2023-02-01 DIAGNOSIS — I45.2 BIFASCICULAR BLOCK: ICD-10-CM

## 2023-02-01 DIAGNOSIS — J44.9 CHRONIC OBSTRUCTIVE PULMONARY DISEASE, UNSPECIFIED: ICD-10-CM

## 2023-02-01 PROCEDURE — 93000 ELECTROCARDIOGRAM COMPLETE: CPT

## 2023-02-01 PROCEDURE — 99214 OFFICE O/P EST MOD 30 MIN: CPT

## 2023-02-01 RX ORDER — FLUTICASONE FUROATE, UMECLIDINIUM BROMIDE AND VILANTEROL TRIFENATATE 100; 62.5; 25 UG/1; UG/1; UG/1
100-62.5-25 POWDER RESPIRATORY (INHALATION) DAILY
Qty: 1 | Refills: 3 | Status: DISCONTINUED | COMMUNITY
Start: 2022-07-15 | End: 2023-02-01

## 2023-02-01 RX ORDER — FLUTICASONE FUROATE, UMECLIDINIUM BROMIDE AND VILANTEROL TRIFENATATE 200; 62.5; 25 UG/1; UG/1; UG/1
200-62.5-25 POWDER RESPIRATORY (INHALATION) DAILY
Qty: 1 | Refills: 3 | Status: DISCONTINUED | COMMUNITY
Start: 2022-07-12 | End: 2023-02-01

## 2023-02-01 RX ORDER — PREDNISONE 20 MG/1
20 TABLET ORAL
Qty: 15 | Refills: 0 | Status: DISCONTINUED | COMMUNITY
Start: 2022-09-16 | End: 2023-02-01

## 2023-02-01 RX ORDER — PIROXICAM 20 MG/1
20 CAPSULE ORAL
Refills: 0 | Status: DISCONTINUED | COMMUNITY
End: 2023-02-01

## 2023-02-01 RX ORDER — FLUTICASONE FUROATE, UMECLIDINIUM BROMIDE AND VILANTEROL TRIFENATATE 100; 62.5; 25 UG/1; UG/1; UG/1
100-62.5-25 POWDER RESPIRATORY (INHALATION) DAILY
Qty: 1 | Refills: 3 | Status: DISCONTINUED | COMMUNITY
Start: 2022-06-02 | End: 2023-02-01

## 2023-02-01 NOTE — PHYSICAL EXAM
[Well Developed] : well developed [Well Nourished] : well nourished [No Acute Distress] : no acute distress [Normal Conjunctiva] : normal conjunctiva [Normal Venous Pressure] : normal venous pressure [No Carotid Bruit] : no carotid bruit [Rhythm Regular] : regular [Normal S1] : normal S1 [Normal S2] : normal S2 [No Murmur] : no murmurs heard [Normal Rate] : the respiratory rate was normal [Decreased Breath Sounds] : breath sounds were decreased diffusely [Rhonchi Bilateral] : rhonchi were heard diffusely over both lungs [Soft] : abdomen soft [No Edema] : no edema [No Focal Deficits] : no focal deficits [Alert and Oriented] : alert and oriented [S3] : no S3 [S4] : no S4 [de-identified] : OA fingers w nodes [de-identified] : right leg with brace secondary to polio [de-identified] : facial skin rash improved

## 2023-02-01 NOTE — REVIEW OF SYSTEMS
[Palpitations] : palpitations [Joint Pain] : joint pain [Skin Lesions] : skin lesion(s): [Fever] : no fever [Chills] : no chills [Blurry Vision] : no blurred vision [Earache] : no earache [Sore Throat] : no sore throat [SOB] : no shortness of breath [Dyspnea on exertion] : not dyspnea during exertion [Chest Discomfort] : no chest discomfort [Syncope] : no syncope [Cough] : no cough [Wheezing] : no wheezing [Abdominal Pain] : no abdominal pain [Diarrhea] : diarrhea [Constipation] : no constipation [Urinary Frequency] : no change in urinary frequency [Erectile Dysfunction] : no erectile dysfunction [Dizziness] : no dizziness [Weakness] : no weakness [FreeTextEntry7] : + cologuard needs colonoscopy [de-identified] : see HPI
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
## 2023-02-22 ENCOUNTER — RESULT REVIEW (OUTPATIENT)
Age: 73
End: 2023-02-22

## 2023-02-22 ENCOUNTER — OUTPATIENT (OUTPATIENT)
Dept: OUTPATIENT SERVICES | Facility: HOSPITAL | Age: 73
LOS: 1 days | End: 2023-02-22
Payer: COMMERCIAL

## 2023-02-22 ENCOUNTER — APPOINTMENT (OUTPATIENT)
Age: 73
End: 2023-02-22

## 2023-02-22 DIAGNOSIS — Z98.890 OTHER SPECIFIED POSTPROCEDURAL STATES: Chronic | ICD-10-CM

## 2023-02-22 DIAGNOSIS — Z00.8 ENCOUNTER FOR OTHER GENERAL EXAMINATION: ICD-10-CM

## 2023-02-22 DIAGNOSIS — R06.02 SHORTNESS OF BREATH: ICD-10-CM

## 2023-02-22 DIAGNOSIS — Z95.5 PRESENCE OF CORONARY ANGIOPLASTY IMPLANT AND GRAFT: Chronic | ICD-10-CM

## 2023-02-22 DIAGNOSIS — H26.9 UNSPECIFIED CATARACT: Chronic | ICD-10-CM

## 2023-02-22 DIAGNOSIS — Z90.2 ACQUIRED ABSENCE OF LUNG [PART OF]: Chronic | ICD-10-CM

## 2023-02-22 DIAGNOSIS — Z90.49 ACQUIRED ABSENCE OF OTHER SPECIFIED PARTS OF DIGESTIVE TRACT: Chronic | ICD-10-CM

## 2023-02-22 PROCEDURE — 78452 HT MUSCLE IMAGE SPECT MULT: CPT | Mod: 26,MH

## 2023-02-22 PROCEDURE — 78452 HT MUSCLE IMAGE SPECT MULT: CPT

## 2023-02-22 PROCEDURE — A9500: CPT

## 2023-02-23 DIAGNOSIS — R06.02 SHORTNESS OF BREATH: ICD-10-CM

## 2023-02-28 ENCOUNTER — APPOINTMENT (OUTPATIENT)
Dept: CARDIOLOGY | Facility: CLINIC | Age: 73
End: 2023-02-28
Payer: COMMERCIAL

## 2023-02-28 PROCEDURE — 93248 EXT ECG>7D<15D REV&INTERPJ: CPT

## 2023-05-03 ENCOUNTER — APPOINTMENT (OUTPATIENT)
Dept: CARDIOLOGY | Facility: CLINIC | Age: 73
End: 2023-05-03
Payer: COMMERCIAL

## 2023-05-03 ENCOUNTER — NON-APPOINTMENT (OUTPATIENT)
Age: 73
End: 2023-05-03

## 2023-05-03 VITALS
SYSTOLIC BLOOD PRESSURE: 140 MMHG | HEIGHT: 69 IN | BODY MASS INDEX: 27.25 KG/M2 | HEART RATE: 65 BPM | DIASTOLIC BLOOD PRESSURE: 80 MMHG | OXYGEN SATURATION: 92 % | WEIGHT: 184 LBS

## 2023-05-03 VITALS — DIASTOLIC BLOOD PRESSURE: 64 MMHG | SYSTOLIC BLOOD PRESSURE: 133 MMHG

## 2023-05-03 DIAGNOSIS — R00.0 TACHYCARDIA, UNSPECIFIED: ICD-10-CM

## 2023-05-03 PROCEDURE — 99214 OFFICE O/P EST MOD 30 MIN: CPT

## 2023-05-03 NOTE — REVIEW OF SYSTEMS
[Joint Pain] : joint pain [Weight Gain (___ Lbs)] : [unfilled] ~Ulb weight gain [Fever] : no fever [Chills] : no chills [Blurry Vision] : no blurred vision [Earache] : no earache [Sore Throat] : no sore throat [SOB] : no shortness of breath [Dyspnea on exertion] : not dyspnea during exertion [Chest Discomfort] : no chest discomfort [Palpitations] : no palpitations [Syncope] : no syncope [Cough] : no cough [Wheezing] : no wheezing [Abdominal Pain] : no abdominal pain [Diarrhea] : diarrhea [Constipation] : no constipation [Urinary Frequency] : no change in urinary frequency [Erectile Dysfunction] : no erectile dysfunction [Skin Lesions] : no skin lesions [Dizziness] : no dizziness [Weakness] : no weakness [FreeTextEntry7] : + cologuard needs colonoscopy [FreeTextEntry9] : wears metal brace RLE s/p polio
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
## 2023-05-03 NOTE — PHYSICAL EXAM
[Well Developed] : well developed [Well Nourished] : well nourished [No Acute Distress] : no acute distress [Normal Conjunctiva] : normal conjunctiva [Normal Venous Pressure] : normal venous pressure [No Carotid Bruit] : no carotid bruit [Rhythm Regular] : regular [Normal S1] : normal S1 [Normal S2] : normal S2 [No Murmur] : no murmurs heard [Normal Rate] : the respiratory rate was normal [Decreased Breath Sounds] : breath sounds were decreased diffusely [Rhonchi Bilateral] : rhonchi were heard diffusely over both lungs [Soft] : abdomen soft [No Edema] : no edema [No Focal Deficits] : no focal deficits [Alert and Oriented] : alert and oriented [No Rash] : no rash [S3] : no S3 [S4] : no S4 [de-identified] : OA fingers w nodes [de-identified] : right leg with brace secondary to polio [de-identified] : rash resolved

## 2023-07-24 ENCOUNTER — TRANSCRIPTION ENCOUNTER (OUTPATIENT)
Age: 73
End: 2023-07-24

## 2023-07-24 ENCOUNTER — APPOINTMENT (OUTPATIENT)
Dept: PULMONOLOGY | Facility: CLINIC | Age: 73
End: 2023-07-24
Payer: COMMERCIAL

## 2023-07-24 VITALS
HEIGHT: 69 IN | SYSTOLIC BLOOD PRESSURE: 120 MMHG | WEIGHT: 180 LBS | HEART RATE: 65 BPM | RESPIRATION RATE: 14 BRPM | BODY MASS INDEX: 26.66 KG/M2 | DIASTOLIC BLOOD PRESSURE: 80 MMHG | OXYGEN SATURATION: 93 %

## 2023-07-24 PROCEDURE — 99213 OFFICE O/P EST LOW 20 MIN: CPT

## 2023-07-24 NOTE — REASON FOR VISIT
[Follow-Up] : a follow-up visit [Lung Cancer] : lung cancer [COPD] : COPD [Shortness of Breath] : shortness of breath [Wheezing] : wheezing

## 2023-07-24 NOTE — HISTORY OF PRESENT ILLNESS
[TextBox_4] : SOB ON  EXERTION, RUN OUT OF TRELEGY\par STILL ACTIVELY SMOKING\par LUNG CANCER/ SP RESECTION\par CHEST CT 6/22

## 2023-08-15 ENCOUNTER — RX RENEWAL (OUTPATIENT)
Age: 73
End: 2023-08-15

## 2023-08-23 ENCOUNTER — APPOINTMENT (OUTPATIENT)
Dept: CARDIOLOGY | Facility: CLINIC | Age: 73
End: 2023-08-23
Payer: COMMERCIAL

## 2023-08-23 VITALS
HEIGHT: 69 IN | HEART RATE: 79 BPM | BODY MASS INDEX: 26.22 KG/M2 | WEIGHT: 177 LBS | DIASTOLIC BLOOD PRESSURE: 74 MMHG | SYSTOLIC BLOOD PRESSURE: 126 MMHG | OXYGEN SATURATION: 93 %

## 2023-08-23 PROCEDURE — 99213 OFFICE O/P EST LOW 20 MIN: CPT
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
## 2023-08-23 NOTE — PHYSICAL EXAM
[Well Developed] : well developed [Well Nourished] : well nourished [No Acute Distress] : no acute distress [Normal Conjunctiva] : normal conjunctiva [Normal Venous Pressure] : normal venous pressure [No Carotid Bruit] : no carotid bruit [Rhythm Regular] : regular [Normal S1] : normal S1 [Normal S2] : normal S2 [No Murmur] : no murmurs heard [Normal Rate] : the respiratory rate was normal [Decreased Breath Sounds] : breath sounds were decreased diffusely [Rhonchi Bilateral] : rhonchi were heard diffusely over both lungs [Soft] : abdomen soft [No Edema] : no edema [No Rash] : no rash [No Focal Deficits] : no focal deficits [Alert and Oriented] : alert and oriented [S3] : no S3 [S4] : no S4 [de-identified] : cyanotic lips  [de-identified] : PO at rest 96% with ambulation  in office drops momentarily to 89% immediately with rest  returns to normal. [de-identified] : OA fingers w nodes [de-identified] : right leg with brace secondary to polio [de-identified] : rash resolved

## 2023-08-23 NOTE — REVIEW OF SYSTEMS
[Joint Pain] : joint pain [Fever] : no fever [Weight Gain (___ Lbs)] : no recent weight gain [Chills] : no chills [Weight Loss (___ Lbs)] : [unfilled] ~Ulb weight loss [Blurry Vision] : no blurred vision [Earache] : no earache [Sore Throat] : no sore throat [SOB] : no shortness of breath [Dyspnea on exertion] : not dyspnea during exertion [Chest Discomfort] : no chest discomfort [Palpitations] : no palpitations [Syncope] : no syncope [Cough] : no cough [Wheezing] : no wheezing [SOB At Rest] : short of breath at rest [SOB With Exertion] : shortness of breath during exertion [Abdominal Pain] : no abdominal pain [Diarrhea] : diarrhea [Constipation] : no constipation [Urinary Frequency] : no change in urinary frequency [Erectile Dysfunction] : no erectile dysfunction [Skin Lesions] : no skin lesions [Dizziness] : no dizziness [Weakness] : no weakness [FreeTextEntry7] : + cologuard needs colonoscopy [FreeTextEntry9] : wears metal brace RLE s/p polio

## 2023-08-23 NOTE — HISTORY OF PRESENT ILLNESS
[FreeTextEntry1] : 72 MI age 40. He had cardiac stent Mu-ism about 2015. He had lobectomy for lung Ca  2015. treated w chemo and later recurrence and then radiation. H/o palpitations. He had sinus tach. Now better on Metoprolol xl 50. He denies sob or heart racing   He gets    Pt also has h/o polio wears right leg brace., OA hand hips   His Lexiscan 2/23 negative , holter 2/14/23 NSR av 63 occ PAC SVT 7 beats at 161. Pt has not completely stopped smoking. smokes 2-3 cigs every few days.  He lost 3 lbs. He is  not more SOB  but has mild SOB at rest. PO 96%  Pt denies chest pain,  palpitations,dizziness, syncope or near syncope.

## 2023-11-08 ENCOUNTER — APPOINTMENT (OUTPATIENT)
Dept: PULMONOLOGY | Facility: CLINIC | Age: 73
End: 2023-11-08

## 2023-11-14 NOTE — ED PROVIDER NOTE - MDM PATIENT STATEMENT FOR ADDL TREATMENT
Prescription sent to pharmacy. Continued current dose.     Jessica HOOKER   Patient with one or more new problems requiring additional work-up/treatment.

## 2023-11-15 ENCOUNTER — APPOINTMENT (OUTPATIENT)
Dept: CARDIOLOGY | Facility: CLINIC | Age: 73
End: 2023-11-15
Payer: COMMERCIAL

## 2023-11-15 VITALS
HEIGHT: 69 IN | HEART RATE: 63 BPM | BODY MASS INDEX: 26.39 KG/M2 | SYSTOLIC BLOOD PRESSURE: 128 MMHG | WEIGHT: 178.2 LBS | OXYGEN SATURATION: 93 % | DIASTOLIC BLOOD PRESSURE: 75 MMHG

## 2023-11-15 PROCEDURE — 99213 OFFICE O/P EST LOW 20 MIN: CPT

## 2023-11-15 PROCEDURE — 36415 COLL VENOUS BLD VENIPUNCTURE: CPT

## 2023-11-16 LAB
ALBUMIN SERPL ELPH-MCNC: 4.3 G/DL
ALP BLD-CCNC: 92 U/L
ALT SERPL-CCNC: 7 U/L
ANION GAP SERPL CALC-SCNC: 8 MMOL/L
AST SERPL-CCNC: 14 U/L
BASOPHILS # BLD AUTO: 0.09 K/UL
BASOPHILS NFR BLD AUTO: 1 %
BILIRUB SERPL-MCNC: 0.2 MG/DL
BUN SERPL-MCNC: 19 MG/DL
CALCIUM SERPL-MCNC: 9.5 MG/DL
CHLORIDE SERPL-SCNC: 102 MMOL/L
CHOLEST SERPL-MCNC: 175 MG/DL
CK SERPL-CCNC: 33 U/L
CO2 SERPL-SCNC: 30 MMOL/L
CREAT SERPL-MCNC: 0.9 MG/DL
EGFR: 90 ML/MIN/1.73M2
EOSINOPHIL # BLD AUTO: 0.51 K/UL
EOSINOPHIL NFR BLD AUTO: 5.9 %
FOLATE SERPL-MCNC: 5.4 NG/ML
GLUCOSE SERPL-MCNC: 107 MG/DL
HCT VFR BLD CALC: 50.7 %
HDLC SERPL-MCNC: 40 MG/DL
HGB BLD-MCNC: 16 G/DL
IMM GRANULOCYTES NFR BLD AUTO: 0.2 %
LDLC SERPL CALC-MCNC: 106 MG/DL
LYMPHOCYTES # BLD AUTO: 1.74 K/UL
LYMPHOCYTES NFR BLD AUTO: 20 %
MAN DIFF?: NORMAL
MCHC RBC-ENTMCNC: 29.4 PG
MCHC RBC-ENTMCNC: 31.6 G/DL
MCV RBC AUTO: 93.2 FL
MONOCYTES # BLD AUTO: 0.72 K/UL
MONOCYTES NFR BLD AUTO: 8.3 %
NEUTROPHILS # BLD AUTO: 5.62 K/UL
NEUTROPHILS NFR BLD AUTO: 64.6 %
NONHDLC SERPL-MCNC: 135 MG/DL
PLATELET # BLD AUTO: 220 K/UL
POTASSIUM SERPL-SCNC: 4.6 MMOL/L
PROT SERPL-MCNC: 6.6 G/DL
RBC # BLD: 5.44 M/UL
RBC # FLD: 13.2 %
SODIUM SERPL-SCNC: 140 MMOL/L
TRIGL SERPL-MCNC: 143 MG/DL
TSH SERPL-ACNC: 1.7 UIU/ML
VIT B12 SERPL-MCNC: 405 PG/ML
WBC # FLD AUTO: 8.7 K/UL

## 2024-02-13 PROBLEM — I47.10 PAROXYSMAL SVT (SUPRAVENTRICULAR TACHYCARDIA): Status: ACTIVE | Noted: 2023-05-03

## 2024-02-13 PROBLEM — Z92.3 HISTORY OF RADIATION THERAPY: Status: ACTIVE | Noted: 2024-02-13

## 2024-02-14 ENCOUNTER — APPOINTMENT (OUTPATIENT)
Dept: CARDIOLOGY | Facility: CLINIC | Age: 74
End: 2024-02-14
Payer: COMMERCIAL

## 2024-02-14 VITALS
WEIGHT: 175 LBS | OXYGEN SATURATION: 93 % | SYSTOLIC BLOOD PRESSURE: 132 MMHG | HEART RATE: 65 BPM | BODY MASS INDEX: 25.92 KG/M2 | DIASTOLIC BLOOD PRESSURE: 72 MMHG | HEIGHT: 69 IN

## 2024-02-14 DIAGNOSIS — I47.10 SUPRAVENTRICULAR TACHYCARDIA, UNSPECIFIED: ICD-10-CM

## 2024-02-14 DIAGNOSIS — Z92.3 PERSONAL HISTORY OF IRRADIATION: ICD-10-CM

## 2024-02-14 DIAGNOSIS — Z85.118 PERSONAL HISTORY OF OTHER MALIGNANT NEOPLASM OF BRONCHUS AND LUNG: ICD-10-CM

## 2024-02-14 PROCEDURE — 93000 ELECTROCARDIOGRAM COMPLETE: CPT

## 2024-02-14 PROCEDURE — 99213 OFFICE O/P EST LOW 20 MIN: CPT

## 2024-02-14 NOTE — REVIEW OF SYSTEMS
[Weight Gain (___ Lbs)] : [unfilled] ~Ulb weight gain [Weight Loss (___ Lbs)] : [unfilled] ~Ulb weight loss [SOB At Rest] : short of breath at rest [SOB With Exertion] : shortness of breath during exertion [Joint Pain] : joint pain [Fever] : no fever [Chills] : no chills [Blurry Vision] : no blurred vision [Earache] : no earache [Sore Throat] : no sore throat [SOB] : no shortness of breath [Dyspnea on exertion] : not dyspnea during exertion [Chest Discomfort] : no chest discomfort [Palpitations] : no palpitations [Syncope] : no syncope [Cough] : no cough [Wheezing] : no wheezing [Abdominal Pain] : no abdominal pain [Diarrhea] : diarrhea [Constipation] : no constipation [Urinary Frequency] : no change in urinary frequency [Erectile Dysfunction] : no erectile dysfunction [Skin Lesions] : no skin lesions [Dizziness] : no dizziness [Weakness] : no weakness [FreeTextEntry7] : + cologuard needs colonoscopy [FreeTextEntry9] : wears metal brace RLE s/p polio will require surgery

## 2024-02-14 NOTE — REASON FOR VISIT
[Symptom and Test Evaluation] : symptom and test evaluation [Coronary Artery Disease] : coronary artery disease [Spouse] : spouse [FreeTextEntry1] : I am here to have my heart checked .I have chronic SOB but it is without change
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
## 2024-02-14 NOTE — PHYSICAL EXAM
[Well Developed] : well developed [No Acute Distress] : no acute distress [Normal Conjunctiva] : normal conjunctiva [Normal Venous Pressure] : normal venous pressure [No Carotid Bruit] : no carotid bruit [Rhythm Regular] : regular [Normal S1] : normal S1 [Normal S2] : normal S2 [No Murmur] : no murmurs heard [Normal Rate] : the respiratory rate was normal [Decreased Breath Sounds] : breath sounds were decreased diffusely [Rhonchi Bilateral] : rhonchi were heard diffusely over both lungs [Soft] : abdomen soft [No Edema] : no edema [No Rash] : no rash [No Focal Deficits] : no focal deficits [Alert and Oriented] : alert and oriented [S3] : no S3 [S4] : no S4 [de-identified] : cyanotic lips chronically [de-identified] : OA fingers w nodes [de-identified] : right leg with brace secondary to polio

## 2024-02-15 ENCOUNTER — APPOINTMENT (OUTPATIENT)
Dept: PULMONOLOGY | Facility: CLINIC | Age: 74
End: 2024-02-15
Payer: COMMERCIAL

## 2024-02-15 PROCEDURE — 94010 BREATHING CAPACITY TEST: CPT

## 2024-02-15 PROCEDURE — 94727 GAS DIL/WSHOT DETER LNG VOL: CPT

## 2024-02-15 PROCEDURE — 94729 DIFFUSING CAPACITY: CPT

## 2024-02-23 ENCOUNTER — APPOINTMENT (OUTPATIENT)
Dept: PULMONOLOGY | Facility: CLINIC | Age: 74
End: 2024-02-23
Payer: COMMERCIAL

## 2024-02-23 VITALS
RESPIRATION RATE: 14 BRPM | BODY MASS INDEX: 25.77 KG/M2 | HEART RATE: 65 BPM | SYSTOLIC BLOOD PRESSURE: 130 MMHG | WEIGHT: 174 LBS | OXYGEN SATURATION: 92 % | HEIGHT: 69 IN | DIASTOLIC BLOOD PRESSURE: 64 MMHG

## 2024-02-23 DIAGNOSIS — R91.1 SOLITARY PULMONARY NODULE: ICD-10-CM

## 2024-02-23 PROCEDURE — 99213 OFFICE O/P EST LOW 20 MIN: CPT

## 2024-02-23 PROCEDURE — G2211 COMPLEX E/M VISIT ADD ON: CPT

## 2024-02-28 ENCOUNTER — OUTPATIENT (OUTPATIENT)
Dept: OUTPATIENT SERVICES | Facility: HOSPITAL | Age: 74
LOS: 1 days | End: 2024-02-28
Payer: COMMERCIAL

## 2024-02-28 ENCOUNTER — APPOINTMENT (OUTPATIENT)
Dept: CV DIAGNOSTICS | Facility: HOSPITAL | Age: 74
End: 2024-02-28
Payer: COMMERCIAL

## 2024-02-28 DIAGNOSIS — Z98.890 OTHER SPECIFIED POSTPROCEDURAL STATES: Chronic | ICD-10-CM

## 2024-02-28 DIAGNOSIS — I25.10 ATHEROSCLEROTIC HEART DISEASE OF NATIVE CORONARY ARTERY WITHOUT ANGINA PECTORIS: ICD-10-CM

## 2024-02-28 DIAGNOSIS — Z95.5 PRESENCE OF CORONARY ANGIOPLASTY IMPLANT AND GRAFT: Chronic | ICD-10-CM

## 2024-02-28 DIAGNOSIS — H26.9 UNSPECIFIED CATARACT: Chronic | ICD-10-CM

## 2024-02-28 DIAGNOSIS — Z90.49 ACQUIRED ABSENCE OF OTHER SPECIFIED PARTS OF DIGESTIVE TRACT: Chronic | ICD-10-CM

## 2024-02-28 DIAGNOSIS — Z90.2 ACQUIRED ABSENCE OF LUNG [PART OF]: Chronic | ICD-10-CM

## 2024-02-28 DIAGNOSIS — I47.10 SUPRAVENTRICULAR TACHYCARDIA, UNSPECIFIED: ICD-10-CM

## 2024-02-28 PROCEDURE — A9500: CPT

## 2024-02-28 PROCEDURE — 93018 CV STRESS TEST I&R ONLY: CPT

## 2024-02-28 PROCEDURE — 78452 HT MUSCLE IMAGE SPECT MULT: CPT | Mod: 26,MC

## 2024-02-28 PROCEDURE — 93016 CV STRESS TEST SUPVJ ONLY: CPT

## 2024-02-28 PROCEDURE — 93017 CV STRESS TEST TRACING ONLY: CPT

## 2024-02-28 PROCEDURE — 78452 HT MUSCLE IMAGE SPECT MULT: CPT | Mod: MC

## 2024-02-28 RX ORDER — REGADENOSON 0.08 MG/ML
0.4 INJECTION, SOLUTION INTRAVENOUS ONCE
Refills: 0 | Status: DISCONTINUED | OUTPATIENT
Start: 2024-02-28 | End: 2024-03-13

## 2024-02-29 DIAGNOSIS — I47.10 SUPRAVENTRICULAR TACHYCARDIA, UNSPECIFIED: ICD-10-CM

## 2024-02-29 DIAGNOSIS — I25.10 ATHEROSCLEROTIC HEART DISEASE OF NATIVE CORONARY ARTERY WITHOUT ANGINA PECTORIS: ICD-10-CM

## 2024-03-10 ENCOUNTER — EMERGENCY (EMERGENCY)
Facility: HOSPITAL | Age: 74
LOS: 0 days | Discharge: HOME | End: 2024-03-10
Attending: STUDENT IN AN ORGANIZED HEALTH CARE EDUCATION/TRAINING PROGRAM
Payer: MEDICARE

## 2024-03-10 VITALS
OXYGEN SATURATION: 99 % | SYSTOLIC BLOOD PRESSURE: 131 MMHG | DIASTOLIC BLOOD PRESSURE: 93 MMHG | TEMPERATURE: 98 F | HEART RATE: 75 BPM | WEIGHT: 167.99 LBS | RESPIRATION RATE: 18 BRPM

## 2024-03-10 DIAGNOSIS — Z95.5 PRESENCE OF CORONARY ANGIOPLASTY IMPLANT AND GRAFT: Chronic | ICD-10-CM

## 2024-03-10 DIAGNOSIS — I25.10 ATHEROSCLEROTIC HEART DISEASE OF NATIVE CORONARY ARTERY WITHOUT ANGINA PECTORIS: ICD-10-CM

## 2024-03-10 DIAGNOSIS — Z99.81 DEPENDENCE ON SUPPLEMENTAL OXYGEN: ICD-10-CM

## 2024-03-10 DIAGNOSIS — Z98.890 OTHER SPECIFIED POSTPROCEDURAL STATES: Chronic | ICD-10-CM

## 2024-03-10 DIAGNOSIS — Z90.2 ACQUIRED ABSENCE OF LUNG [PART OF]: Chronic | ICD-10-CM

## 2024-03-10 DIAGNOSIS — M25.521 PAIN IN RIGHT ELBOW: ICD-10-CM

## 2024-03-10 DIAGNOSIS — M70.21 OLECRANON BURSITIS, RIGHT ELBOW: ICD-10-CM

## 2024-03-10 DIAGNOSIS — H26.9 UNSPECIFIED CATARACT: Chronic | ICD-10-CM

## 2024-03-10 DIAGNOSIS — Z90.49 ACQUIRED ABSENCE OF OTHER SPECIFIED PARTS OF DIGESTIVE TRACT: Chronic | ICD-10-CM

## 2024-03-10 DIAGNOSIS — J44.9 CHRONIC OBSTRUCTIVE PULMONARY DISEASE, UNSPECIFIED: ICD-10-CM

## 2024-03-10 DIAGNOSIS — K21.9 GASTRO-ESOPHAGEAL REFLUX DISEASE WITHOUT ESOPHAGITIS: ICD-10-CM

## 2024-03-10 PROCEDURE — 73080 X-RAY EXAM OF ELBOW: CPT | Mod: 26,RT

## 2024-03-10 PROCEDURE — 99284 EMERGENCY DEPT VISIT MOD MDM: CPT | Mod: FS

## 2024-03-10 PROCEDURE — 73080 X-RAY EXAM OF ELBOW: CPT | Mod: RT

## 2024-03-10 PROCEDURE — 99283 EMERGENCY DEPT VISIT LOW MDM: CPT | Mod: 25

## 2024-03-10 NOTE — ED PROVIDER NOTE - PATIENT PORTAL LINK FT
You can access the FollowMyHealth Patient Portal offered by [facility] by registering at the following website: [email or web address]. By joining [employer]’s FollowMyHealth portal, you will also be able to view your health information using other applications (apps) compatible with our system.

## 2024-03-10 NOTE — ED PROVIDER NOTE - NSFOLLOWUPCLINICS_GEN_ALL_ED_FT
[facility]  Orthpedic  [address], [place]   Phone: [phone]  Fax:   Follow Up Time: 1-3 Days

## 2024-03-10 NOTE — ED PROVIDER NOTE - CLINICAL SUMMARY MEDICAL DECISION MAKING FREE TEXT BOX
74-year-old male with a history of COPD on 3 L nasal cannula, previously lung CA now in remission, current smoker attempting to quit (last smoked 2 days ago), CAD, GERD, polio, presenting with right elbow swelling noticed yesterday, no alleviating or aggravating factors, no associated symptoms. Denies trauma, numbness, tingling ,weakness, decreased ROM. Right elbow with likely bursitis. No erythema or tenderness. FROM of elbow. Imaging ordered and reviewed. ACE wrapped applied. Discussed return precautions and follow up outpatient. Patient comfortable with plan.

## 2024-03-10 NOTE — ED PROVIDER NOTE - PHYSICAL EXAMINATION
generalized: alert, no distress  eyes: clear conjunctiva  msk: right elbow-  no bony deformity, good rom of extremity, good cap refill, pulses distally in tact, no crepitation , deformity  skin: bursitis without any tenderness, erythema, warmth   heme: no lymphangitis   neuro: alert, oriented, no motor or sensory deficits, gait steady

## 2024-03-10 NOTE — ED PROVIDER NOTE - NSFOLLOWUPINSTRUCTIONS_ED_ALL_ED_FT
Elbow Bursitis     Bursitis is swelling and pain at the tip of the elbow. This happens when fluid builds up in a sac under the skin (bursa). This may also be called olecranon bursitis.  What are the causes?  Elbow bursitis may be caused by:  Elbow injury, such as falling onto the elbow.Leaning on hard surfaces for long periods of time.Infection from an injury that breaks the skin near the elbow.A bone growth (spur) that forms at the tip of the elbow.A medical condition that causes inflammation, such as gout or rheumatoid arthritis.Sometimes the cause is not known.  What are the signs or symptoms?  The first sign of elbow bursitis is usually swelling at the tip of the elbow. This can grow to be about the size of a golf ball. Swelling may start suddenly or develop gradually. Other symptoms may include:  Pain when bending or leaning on the elbow.Not being able to move the elbow normally.If bursitis is caused by an infection, you may have:  Redness, warmth, and tenderness of the elbow.Drainage of pus from the swollen area over the elbow, if the skin breaks open.How is this diagnosed?  This condition may be diagnosed based on:  Your symptoms and medical history.Any recent injuries you have had.A physical exam.X-rays to check for a bone spur or fracture.Draining fluid from the bursa to test it for infection.Blood tests to rule out gout or rheumatoid arthritis.How is this treated?  Treatment for elbow bursitis depends on the cause. Treatment may include:  Medicines. These may include:  Over-the-counter medicines to relieve pain and inflammation.Antibiotic medicines.Injections of anti-inflammatory medicines (steroids).Draining fluid from the bursa.Wrapping your elbow with a bandage.Wearing elbow pads.If these treatments do not help, you may need surgery to remove the bursa.  Follow these instructions at home:  Medicines     Take over-the-counter and prescription medicines only as told by your health care provider.If you were prescribed an antibiotic medicine, take it as told by your health care provider. Do not stop taking the antibiotic even if you start to feel better.Managing pain, stiffness, and swelling        If directed, put ice on your elbow:  Put ice in a plastic bag.Place a towel between your skin and the bag.Leave the ice on for 20 minutes, 2–3 times a day.If your bursitis is caused by an injury, rest your elbow and wear your bandage as told by your health care provider.Use elbow pads or elbow wraps to cushion your elbow as needed.General instructions     Avoid any activities that cause elbow pain. Ask your health care provider what activities are safe for you.Keep all follow-up visits as told by your health care provider. This is important.Contact a health care provider if you have:  A fever.Symptoms that do not get better with treatment.Pain or swelling that:  Gets worse.Goes away and then comes back.Pus draining from your elbow.Get help right away if you have:  Trouble moving your arm, hand, or fingers.Summary  Elbow bursitis is inflammation of the fluid-filled sac (bursa) between the tip of your elbow bone (olecranon) and your skin.Treatment for elbow bursitis depends on the cause. It may include medicines to relieve pain and inflammation, antibiotic medicines, and draining fluid from your elbow.Contact a health care provider if your symptoms do not get better with treatment, or if your symptoms go away and then come back.This information is not intended to replace advice given to you by your health care provider. Make sure you discuss any questions you have with your health care provider.

## 2024-03-10 NOTE — ED PROVIDER NOTE - OBJECTIVE STATEMENT
75 y/o male right handed presents to the Ed with right elbow pain since yesterday. patient denies any trauma or falls. no streaking up the arm. no fevers. no tingling distally. patient with good movement of elbow

## 2024-03-16 ENCOUNTER — APPOINTMENT (OUTPATIENT)
Dept: ORTHOPEDIC SURGERY | Facility: CLINIC | Age: 74
End: 2024-03-16
Payer: COMMERCIAL

## 2024-03-16 VITALS — HEIGHT: 69 IN | BODY MASS INDEX: 25.18 KG/M2 | WEIGHT: 170 LBS

## 2024-03-16 PROCEDURE — 99213 OFFICE O/P EST LOW 20 MIN: CPT | Mod: 25

## 2024-03-16 PROCEDURE — 20605 DRAIN/INJ JOINT/BURSA W/O US: CPT | Mod: RT

## 2024-03-16 NOTE — DISCUSSION/SUMMARY
[de-identified] : Impression: Right olecranon bursitis  Plan: Patient was advised for an aspiration of the olecranon bursa.  Patient agrees. Sterile technique using alcohol ascending aseptic and ethyl chloride as a anesthetic, aspiration was conducted in a sterile technique, 9 cc of fluid was aspirated. Band-Aid was placed over the puncture site. Patient was advised to keep compression for about a week. Patient was advised that if the bursa advised to get full with fluid again, at the earliest that we can retrain will be in   Follow-up:

## 2024-03-16 NOTE — HISTORY OF PRESENT ILLNESS
[de-identified] : 74-year-old male here for an evaluation of soft tissue mass over the right elbow, patient states that about a week ago he noticed a lump over the elbow, patient was seen to the emergency room, x-rays were done, he was advised for orthopedic for possible aspiration. He denies any trauma or any injury. He denies any pain over the elbow.

## 2024-03-16 NOTE — IMAGING
[de-identified] : Examination of the right elbow, patient has a small amount of fluid over the olecranon bursal sac. Patient has excellent range of motion to the right elbow with no end of range pain. There is no pain when palpating over the olecranon. Good motion to elbow flexion and extension. Neurovascular intact.  Radiographs were done at [facility], these x-rays were reviewed in office today, there is no acute fracture or dislocation, there is fluid over the olecranon and a small olecranon spur.

## 2024-04-15 ENCOUNTER — RX RENEWAL (OUTPATIENT)
Age: 74
End: 2024-04-15

## 2024-04-17 ENCOUNTER — RX RENEWAL (OUTPATIENT)
Age: 74
End: 2024-04-17

## 2024-04-25 ENCOUNTER — APPOINTMENT (OUTPATIENT)
Dept: ORTHOPEDIC SURGERY | Facility: CLINIC | Age: 74
End: 2024-04-25

## 2024-05-02 ENCOUNTER — APPOINTMENT (OUTPATIENT)
Dept: ORTHOPEDIC SURGERY | Facility: CLINIC | Age: 74
End: 2024-05-02

## 2024-05-09 ENCOUNTER — APPOINTMENT (OUTPATIENT)
Dept: ORTHOPEDIC SURGERY | Facility: CLINIC | Age: 74
End: 2024-05-09

## 2024-05-09 PROCEDURE — 20610 DRAIN/INJ JOINT/BURSA W/O US: CPT | Mod: RT

## 2024-05-09 NOTE — DISCUSSION/SUMMARY
[de-identified] : The patient wished to move forward with an aspiration of  the olecranon bursa.  I went through risks benefits alternatives of the aspiration including reaccumulation, infection, continuous drainage, and pain.  The patient understood these risks and wished to move forward with  an aspiration.  Under sterile conditions the area was cleaned with Betadine and alcohol, sprayed with ethyl chloride, and a total of   7  cc  of blood-tinged straw-colored fluid was aspirated.  A compression dressing was placed on the patient.  Red flag symptoms discussed.  I will give the patient follow-up in 3 weeks with Dr. Rothman for repeat evaluation and treatment. All questions and concerns addressed to patient's satisfaction. Patient expresses full understanding of treatment plan.

## 2024-05-09 NOTE — HISTORY OF PRESENT ILLNESS
[de-identified] : 74-year-old male here for evaluation 2 weeks status post his initial evaluation from the walk-in for an olecranon bursitis.  Patient's olecranon bursitis was drained at the initial visit where 9 cc were removed from the elbow.  He reports he had been utilizing compression in the form of an Ace bandage over the elbow, however, his elbow felt back up with fluid.  Denies any significant pain.  Denies any numbness or tingling.

## 2024-05-09 NOTE — IMAGING
[de-identified] : Physical examination of the right elbow: Swelling appreciated over the olecranon bursa.  No ecchymosis or erythema appreciated skin is intact.  No signs of infection.  No erythema.  Nontender to palpation.  Can fully flex and extend.  Fluid appreciated in the olecranon bursa that is mobile.  No active bleeding or discharge.  Sensorimotor intact distally.  Neuro vas intact.

## 2024-05-15 ENCOUNTER — APPOINTMENT (OUTPATIENT)
Dept: CARDIOLOGY | Facility: CLINIC | Age: 74
End: 2024-05-15
Payer: MEDICARE

## 2024-05-15 VITALS
WEIGHT: 168 LBS | BODY MASS INDEX: 24.88 KG/M2 | OXYGEN SATURATION: 88 % | HEIGHT: 69 IN | DIASTOLIC BLOOD PRESSURE: 71 MMHG | HEART RATE: 74 BPM | SYSTOLIC BLOOD PRESSURE: 115 MMHG

## 2024-05-15 VITALS — OXYGEN SATURATION: 95 %

## 2024-05-15 DIAGNOSIS — R06.02 SHORTNESS OF BREATH: ICD-10-CM

## 2024-05-15 DIAGNOSIS — Z86.79 PERSONAL HISTORY OF OTHER DISEASES OF THE CIRCULATORY SYSTEM: ICD-10-CM

## 2024-05-15 DIAGNOSIS — I25.10 ATHEROSCLEROTIC HEART DISEASE OF NATIVE CORONARY ARTERY W/OUT ANGINA PECTORIS: ICD-10-CM

## 2024-05-15 DIAGNOSIS — J44.9 CHRONIC OBSTRUCTIVE PULMONARY DISEASE, UNSPECIFIED: ICD-10-CM

## 2024-05-15 PROCEDURE — 99213 OFFICE O/P EST LOW 20 MIN: CPT

## 2024-05-15 PROCEDURE — 36415 COLL VENOUS BLD VENIPUNCTURE: CPT

## 2024-05-15 RX ORDER — TRAZODONE HYDROCHLORIDE 150 MG/1
150 TABLET ORAL
Refills: 0 | Status: ACTIVE | COMMUNITY

## 2024-05-15 RX ORDER — ACETAMINOPHEN 500 MG
500 TABLET ORAL
Refills: 0 | Status: ACTIVE | COMMUNITY

## 2024-05-15 RX ORDER — FLUTICASONE FUROATE, UMECLIDINIUM BROMIDE AND VILANTEROL TRIFENATATE 100; 62.5; 25 UG/1; UG/1; UG/1
100-62.5-25 POWDER RESPIRATORY (INHALATION)
Qty: 60 | Refills: 5 | Status: DISCONTINUED | COMMUNITY
Start: 2024-02-23 | End: 2024-05-15

## 2024-05-15 RX ORDER — ROSUVASTATIN CALCIUM 10 MG/1
10 TABLET, FILM COATED ORAL
Qty: 90 | Refills: 3 | Status: ACTIVE | COMMUNITY
Start: 2023-11-17

## 2024-05-15 RX ORDER — ESOMEPRAZOLE MAGNESIUM 40 MG/1
40 CAPSULE, DELAYED RELEASE ORAL DAILY
Refills: 0 | Status: ACTIVE | COMMUNITY

## 2024-05-15 RX ORDER — METOPROLOL SUCCINATE 50 MG/1
50 TABLET, EXTENDED RELEASE ORAL DAILY
Qty: 90 | Refills: 3 | Status: ACTIVE | COMMUNITY

## 2024-05-15 RX ORDER — FLUTICASONE FUROATE, UMECLIDINIUM BROMIDE AND VILANTEROL TRIFENATATE 100; 62.5; 25 UG/1; UG/1; UG/1
100-62.5-25 POWDER RESPIRATORY (INHALATION)
Qty: 60 | Refills: 5 | Status: DISCONTINUED | COMMUNITY
Start: 2023-07-24 | End: 2024-05-15

## 2024-05-15 RX ORDER — ALBUTEROL SULFATE 90 UG/1
108 (90 BASE) INHALANT RESPIRATORY (INHALATION) EVERY 4 HOURS
Qty: 6.7 | Refills: 5 | Status: ACTIVE | COMMUNITY
Start: 2024-02-23

## 2024-05-15 RX ORDER — FLUTICASONE FUROATE, UMECLIDINIUM BROMIDE AND VILANTEROL TRIFENATATE 100; 62.5; 25 UG/1; UG/1; UG/1
100-62.5-25 POWDER RESPIRATORY (INHALATION) DAILY
Qty: 1 | Refills: 3 | Status: DISCONTINUED | COMMUNITY
Start: 2022-09-16 | End: 2024-05-15
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
## 2024-05-15 NOTE — PHYSICAL EXAM
[Well Developed] : well developed [No Acute Distress] : no acute distress [Normal Conjunctiva] : normal conjunctiva [Normal Venous Pressure] : normal venous pressure [No Carotid Bruit] : no carotid bruit [Rhythm Regular] : regular [Normal S1] : normal S1 [Normal S2] : normal S2 [No Murmur] : no murmurs heard [Normal Rate] : the respiratory rate was normal [Decreased Breath Sounds] : breath sounds were decreased diffusely [Rhonchi Bilateral] : rhonchi were heard diffusely over both lungs [Soft] : abdomen soft [No Edema] : no edema [No Rash] : no rash [No Focal Deficits] : no focal deficits [Alert and Oriented] : alert and oriented [S3] : no S3 [S4] : no S4 [de-identified] : cyanotic lips chronically [de-identified] : BS very tight bilat [de-identified] : OA fingers w nodes [de-identified] : right leg with brace secondary to polio

## 2024-05-15 NOTE — ED ADULT TRIAGE NOTE - HEART RATE (BEATS/MIN)
Pt Name: Jc Caraballo  MRN: [id number]  YOB: 1985  Date of evaluation: 5/15/2024    I have examined the patient and reviewed the H&P/Consult and there are no changes to the patient or plans.         Electronically signed by DANYEL SANDERS MD on 5/15/2024 at 10:08 AM     121
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
## 2024-05-15 NOTE — REASON FOR VISIT
[Symptom and Test Evaluation] : symptom and test evaluation [Coronary Artery Disease] : coronary artery disease [Spouse] : spouse [FreeTextEntry1] : I have chronic SOB but becoming worse. Still smoking 1 cig /day in AM

## 2024-05-16 LAB
ALBUMIN SERPL ELPH-MCNC: 4.3 G/DL
ALP BLD-CCNC: 105 U/L
ALT SERPL-CCNC: 8 U/L
ANION GAP SERPL CALC-SCNC: 12 MMOL/L
AST SERPL-CCNC: 14 U/L
BASOPHILS # BLD AUTO: 0.08 K/UL
BASOPHILS NFR BLD AUTO: 1 %
BILIRUB SERPL-MCNC: 0.4 MG/DL
BUN SERPL-MCNC: 18 MG/DL
CALCIUM SERPL-MCNC: 9.2 MG/DL
CHLORIDE SERPL-SCNC: 97 MMOL/L
CHOLEST SERPL-MCNC: 100 MG/DL
CO2 SERPL-SCNC: 27 MMOL/L
CREAT SERPL-MCNC: 1 MG/DL
CREAT SPEC-SCNC: 260 MG/DL
EGFR: 79 ML/MIN/1.73M2
EOSINOPHIL # BLD AUTO: 0.5 K/UL
EOSINOPHIL NFR BLD AUTO: 6.4 %
ESTIMATED AVERAGE GLUCOSE: 123 MG/DL
GLUCOSE SERPL-MCNC: 162 MG/DL
HBA1C MFR BLD HPLC: 5.9 %
HCT VFR BLD CALC: 51.5 %
HDLC SERPL-MCNC: 35 MG/DL
HGB BLD-MCNC: 16.6 G/DL
IMM GRANULOCYTES NFR BLD AUTO: 0.3 %
LDLC SERPL CALC-MCNC: 43 MG/DL
LYMPHOCYTES # BLD AUTO: 1.66 K/UL
LYMPHOCYTES NFR BLD AUTO: 21.3 %
MAN DIFF?: NORMAL
MCHC RBC-ENTMCNC: 30.5 PG
MCHC RBC-ENTMCNC: 32.2 G/DL
MCV RBC AUTO: 94.7 FL
MICROALBUMIN 24H UR DL<=1MG/L-MCNC: 1.3 MG/DL
MICROALBUMIN/CREAT 24H UR-RTO: 5 MG/G
MONOCYTES # BLD AUTO: 0.55 K/UL
MONOCYTES NFR BLD AUTO: 7 %
NEUTROPHILS # BLD AUTO: 5 K/UL
NEUTROPHILS NFR BLD AUTO: 64 %
NONHDLC SERPL-MCNC: 65 MG/DL
PLATELET # BLD AUTO: 212 K/UL
PMV BLD AUTO: 0 /100 WBCS
POTASSIUM SERPL-SCNC: 4.2 MMOL/L
PROT SERPL-MCNC: 6.7 G/DL
RBC # BLD: 5.44 M/UL
RBC # FLD: 13.1 %
SODIUM SERPL-SCNC: 136 MMOL/L
TRIGL SERPL-MCNC: 111 MG/DL
TSH SERPL-ACNC: 1.83 UIU/ML
WBC # FLD AUTO: 7.81 K/UL

## 2024-05-31 ENCOUNTER — APPOINTMENT (OUTPATIENT)
Dept: ORTHOPEDIC SURGERY | Facility: CLINIC | Age: 74
End: 2024-05-31

## 2024-06-05 ENCOUNTER — APPOINTMENT (OUTPATIENT)
Dept: ORTHOPEDIC SURGERY | Facility: CLINIC | Age: 74
End: 2024-06-05

## 2024-06-05 DIAGNOSIS — M70.21 OLECRANON BURSITIS, RIGHT ELBOW: ICD-10-CM

## 2024-06-05 PROCEDURE — 99204 OFFICE O/P NEW MOD 45 MIN: CPT | Mod: 1L

## 2024-06-06 PROBLEM — M70.21 OLECRANON BURSITIS OF RIGHT ELBOW: Status: ACTIVE | Noted: 2024-03-16

## 2024-06-06 NOTE — ASSESSMENT
[FreeTextEntry1] : Patient comes in for follow-up of his olecranon bursitis.  He says he is doing a lot better.  He says whenever he uses the Ace bandage it causes him to have significant swelling of the arm therefore has not been doing that.  It causes him pain to lean on the elbow.  The swelling is gone down significantly but there is still a bump in the area.  He does not have other complaints today.  Right upper extremity: Small olecranon bursitis which is minimally tender to palpation, no evidence of infection, full range of motion of the elbow, neurovascular intact  Patient has olecranon bursitis.  At this time I do not believe it is is necessary to perform an aspiration.  The area seems firm and I do not believe we will get much fluid out of the area.  I discussed with patient getting an elbow compression sleeve and I showed him what to get.  Hopefully this will help him.  I recommend wearing it 23+ hours a day only taking off for bathing.  Once it resolves and improves he will gradually wean himself off of it.  If it returns or gets worse he will return.  I also discussed with him not leaning on the elbow she says he does a lot.  In addition the patient has polio and is in need of a leg brace.  He normally uses 1.  His is worn out.  He needs a full-length leg brace.

## 2024-06-21 ENCOUNTER — RX RENEWAL (OUTPATIENT)
Age: 74
End: 2024-06-21

## 2024-06-21 RX ORDER — AMLODIPINE BESYLATE 5 MG/1
5 TABLET ORAL DAILY
Qty: 90 | Refills: 3 | Status: ACTIVE | COMMUNITY
Start: 2022-08-23 | End: 1900-01-01

## 2024-07-19 ENCOUNTER — APPOINTMENT (OUTPATIENT)
Dept: ORTHOPEDIC SURGERY | Facility: CLINIC | Age: 74
End: 2024-07-19

## 2024-08-21 ENCOUNTER — APPOINTMENT (OUTPATIENT)
Dept: PULMONOLOGY | Facility: CLINIC | Age: 74
End: 2024-08-21
Payer: MEDICARE

## 2024-08-21 VITALS
HEART RATE: 65 BPM | DIASTOLIC BLOOD PRESSURE: 60 MMHG | BODY MASS INDEX: 23.85 KG/M2 | HEIGHT: 69 IN | WEIGHT: 161 LBS | SYSTOLIC BLOOD PRESSURE: 118 MMHG | OXYGEN SATURATION: 93 %

## 2024-08-21 DIAGNOSIS — J44.9 CHRONIC OBSTRUCTIVE PULMONARY DISEASE, UNSPECIFIED: ICD-10-CM

## 2024-08-21 DIAGNOSIS — R91.1 SOLITARY PULMONARY NODULE: ICD-10-CM

## 2024-08-21 DIAGNOSIS — Z85.118 PERSONAL HISTORY OF OTHER MALIGNANT NEOPLASM OF BRONCHUS AND LUNG: ICD-10-CM

## 2024-08-21 PROCEDURE — 99213 OFFICE O/P EST LOW 20 MIN: CPT

## 2024-08-21 PROCEDURE — G2211 COMPLEX E/M VISIT ADD ON: CPT

## 2024-08-21 RX ORDER — FLUTICASONE FUROATE, UMECLIDINIUM BROMIDE AND VILANTEROL TRIFENATATE 100; 62.5; 25 UG/1; UG/1; UG/1
100-62.5-25 POWDER RESPIRATORY (INHALATION)
Qty: 60 | Refills: 5 | Status: ACTIVE | COMMUNITY
Start: 2024-08-21 | End: 1900-01-01

## 2024-08-21 NOTE — HISTORY OF PRESENT ILLNESS
[TextBox_4] : SOB ON  EXERTION STILL SMOKING SOB ON EXERTION LUNG CANCER/ SP RESECTION 2015 SP CHEMO/ RT

## 2024-08-22 PROBLEM — Z86.79 HISTORY OF HYPERTENSION: Status: RESOLVED | Noted: 2022-06-02 | Resolved: 2024-08-22
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
## 2024-08-22 NOTE — PHYSICAL EXAM
[No Acute Distress] : no acute distress [Normal Conjunctiva] : normal conjunctiva [Normal Venous Pressure] : normal venous pressure [No Carotid Bruit] : no carotid bruit [Rhythm Regular] : regular [Normal S1] : normal S1 [Normal S2] : normal S2 [No Murmur] : no murmurs heard [Normal Rate] : the respiratory rate was normal [Decreased Breath Sounds] : breath sounds were decreased diffusely [Rhonchi Bilateral] : rhonchi were heard diffusely over both lungs [Soft] : abdomen soft [No Edema] : no edema [No Rash] : no rash [No Focal Deficits] : no focal deficits [Alert and Oriented] : alert and oriented [S3] : no S3 [S4] : no S4 [de-identified] : cyanotic lips chronically [de-identified] : BS very tight bilat [de-identified] : OA fingers w nodes [de-identified] : right leg with brace secondary to polio

## 2024-08-28 ENCOUNTER — APPOINTMENT (OUTPATIENT)
Dept: CARDIOLOGY | Facility: CLINIC | Age: 74
End: 2024-08-28

## 2024-08-28 DIAGNOSIS — Z86.79 PERSONAL HISTORY OF OTHER DISEASES OF THE CIRCULATORY SYSTEM: ICD-10-CM

## 2024-08-28 DIAGNOSIS — J44.9 CHRONIC OBSTRUCTIVE PULMONARY DISEASE, UNSPECIFIED: ICD-10-CM

## 2024-08-28 DIAGNOSIS — I25.10 ATHEROSCLEROTIC HEART DISEASE OF NATIVE CORONARY ARTERY W/OUT ANGINA PECTORIS: ICD-10-CM

## 2024-09-22 NOTE — PHYSICAL EXAM
[S3] : no S3 [S4] : no S4 [de-identified] : cyanotic lips chronically [de-identified] : BS very tight bilat [de-identified] : OA fingers w nodes [de-identified] : right leg with brace secondary to polio
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
## 2024-09-22 NOTE — REVIEW OF SYSTEMS
[Fever] : no fever [Chills] : no chills [Blurry Vision] : no blurred vision [Earache] : no earache [Sore Throat] : no sore throat [SOB] : no shortness of breath [Dyspnea on exertion] : not dyspnea during exertion [Chest Discomfort] : no chest discomfort [Palpitations] : no palpitations [Syncope] : no syncope [Cough] : no cough [Wheezing] : no wheezing [Abdominal Pain] : no abdominal pain [Diarrhea] : diarrhea [Constipation] : no constipation [Urinary Frequency] : no change in urinary frequency [Erectile Dysfunction] : no erectile dysfunction [Skin Lesions] : no skin lesions [Dizziness] : no dizziness [Weakness] : no weakness [FreeTextEntry7] : + cologuard needs colonoscopy [FreeTextEntry9] : wears metal brace RLE s/p polio will require surgery

## 2024-09-25 ENCOUNTER — APPOINTMENT (OUTPATIENT)
Dept: CARDIOLOGY | Facility: CLINIC | Age: 74
End: 2024-09-25

## 2024-09-25 DIAGNOSIS — I25.10 ATHEROSCLEROTIC HEART DISEASE OF NATIVE CORONARY ARTERY W/OUT ANGINA PECTORIS: ICD-10-CM

## 2025-02-06 ENCOUNTER — APPOINTMENT (OUTPATIENT)
Dept: PULMONOLOGY | Facility: CLINIC | Age: 75
End: 2025-02-06
Payer: COMMERCIAL

## 2025-02-06 VITALS
DIASTOLIC BLOOD PRESSURE: 80 MMHG | HEART RATE: 95 BPM | OXYGEN SATURATION: 89 % | HEIGHT: 69 IN | WEIGHT: 160 LBS | SYSTOLIC BLOOD PRESSURE: 150 MMHG | BODY MASS INDEX: 23.7 KG/M2 | RESPIRATION RATE: 15 BRPM

## 2025-02-06 DIAGNOSIS — F17.200 NICOTINE DEPENDENCE, UNSPECIFIED, UNCOMPLICATED: ICD-10-CM

## 2025-02-06 DIAGNOSIS — J44.9 CHRONIC OBSTRUCTIVE PULMONARY DISEASE, UNSPECIFIED: ICD-10-CM

## 2025-02-06 DIAGNOSIS — R91.1 SOLITARY PULMONARY NODULE: ICD-10-CM

## 2025-02-06 PROCEDURE — 99214 OFFICE O/P EST MOD 30 MIN: CPT

## 2025-02-06 PROCEDURE — G2211 COMPLEX E/M VISIT ADD ON: CPT | Mod: NC

## 2025-02-06 RX ORDER — BUDESONIDE AND FORMOTEROL FUMARATE DIHYDRATE 160; 4.5 UG/1; UG/1
160-4.5 AEROSOL RESPIRATORY (INHALATION) TWICE DAILY
Qty: 1 | Refills: 3 | Status: ACTIVE | COMMUNITY
Start: 2025-02-06 | End: 1900-01-01

## 2025-02-06 RX ORDER — ALBUTEROL SULFATE 90 UG/1
108 (90 BASE) AEROSOL, METERED RESPIRATORY (INHALATION)
Qty: 1 | Refills: 6 | Status: ACTIVE | COMMUNITY
Start: 2025-02-06 | End: 1900-01-01

## 2025-02-06 RX ORDER — PREDNISONE 20 MG/1
20 TABLET ORAL
Qty: 15 | Refills: 0 | Status: ACTIVE | COMMUNITY
Start: 2025-02-06 | End: 1900-01-01

## 2025-02-27 ENCOUNTER — NON-APPOINTMENT (OUTPATIENT)
Age: 75
End: 2025-02-27

## 2025-04-04 ENCOUNTER — RX RENEWAL (OUTPATIENT)
Age: 75
End: 2025-04-04

## 2025-06-05 ENCOUNTER — RX RENEWAL (OUTPATIENT)
Age: 75
End: 2025-06-05

## 2025-06-06 ENCOUNTER — NON-APPOINTMENT (OUTPATIENT)
Age: 75
End: 2025-06-06

## 2025-06-06 ENCOUNTER — APPOINTMENT (OUTPATIENT)
Dept: PULMONOLOGY | Facility: CLINIC | Age: 75
End: 2025-06-06

## 2025-07-07 ENCOUNTER — RX RENEWAL (OUTPATIENT)
Age: 75
End: 2025-07-07

## 2025-08-31 ENCOUNTER — EMERGENCY (EMERGENCY)
Facility: HOSPITAL | Age: 75
LOS: 0 days | Discharge: HOME | End: 2025-08-31
Attending: EMERGENCY MEDICINE
Payer: COMMERCIAL

## 2025-08-31 VITALS
DIASTOLIC BLOOD PRESSURE: 62 MMHG | HEART RATE: 67 BPM | OXYGEN SATURATION: 97 % | RESPIRATION RATE: 18 BRPM | SYSTOLIC BLOOD PRESSURE: 124 MMHG

## 2025-08-31 VITALS
OXYGEN SATURATION: 95 % | DIASTOLIC BLOOD PRESSURE: 70 MMHG | SYSTOLIC BLOOD PRESSURE: 106 MMHG | WEIGHT: 130.07 LBS | RESPIRATION RATE: 17 BRPM | HEART RATE: 89 BPM | TEMPERATURE: 98 F

## 2025-08-31 DIAGNOSIS — R06.02 SHORTNESS OF BREATH: ICD-10-CM

## 2025-08-31 DIAGNOSIS — Z95.5 PRESENCE OF CORONARY ANGIOPLASTY IMPLANT AND GRAFT: Chronic | ICD-10-CM

## 2025-08-31 DIAGNOSIS — R07.9 CHEST PAIN, UNSPECIFIED: ICD-10-CM

## 2025-08-31 DIAGNOSIS — Z87.891 PERSONAL HISTORY OF NICOTINE DEPENDENCE: ICD-10-CM

## 2025-08-31 DIAGNOSIS — H26.9 UNSPECIFIED CATARACT: Chronic | ICD-10-CM

## 2025-08-31 DIAGNOSIS — Z98.890 OTHER SPECIFIED POSTPROCEDURAL STATES: Chronic | ICD-10-CM

## 2025-08-31 DIAGNOSIS — Z90.2 ACQUIRED ABSENCE OF LUNG [PART OF]: Chronic | ICD-10-CM

## 2025-08-31 DIAGNOSIS — R06.2 WHEEZING: ICD-10-CM

## 2025-08-31 DIAGNOSIS — Z90.49 ACQUIRED ABSENCE OF OTHER SPECIFIED PARTS OF DIGESTIVE TRACT: Chronic | ICD-10-CM

## 2025-08-31 LAB
ALBUMIN SERPL ELPH-MCNC: 4.1 G/DL — SIGNIFICANT CHANGE UP (ref 3.5–5.2)
ALP SERPL-CCNC: 131 U/L — HIGH (ref 30–115)
ALT FLD-CCNC: 7 U/L — SIGNIFICANT CHANGE UP (ref 0–41)
ANION GAP SERPL CALC-SCNC: 13 MMOL/L — SIGNIFICANT CHANGE UP (ref 7–14)
APTT BLD: 36.5 SEC — SIGNIFICANT CHANGE UP (ref 27–39.2)
AST SERPL-CCNC: 12 U/L — SIGNIFICANT CHANGE UP (ref 0–41)
BASE EXCESS BLDV CALC-SCNC: 6.2 MMOL/L — HIGH (ref -2–3)
BASOPHILS # BLD AUTO: 0.07 K/UL — SIGNIFICANT CHANGE UP (ref 0–0.2)
BASOPHILS NFR BLD AUTO: 0.5 % — SIGNIFICANT CHANGE UP (ref 0–2)
BILIRUB SERPL-MCNC: 0.8 MG/DL — SIGNIFICANT CHANGE UP (ref 0.2–1.2)
BUN SERPL-MCNC: 18 MG/DL — SIGNIFICANT CHANGE UP (ref 10–20)
CA-I SERPL-SCNC: 1.19 MMOL/L — SIGNIFICANT CHANGE UP (ref 1.15–1.33)
CALCIUM SERPL-MCNC: 9.5 MG/DL — SIGNIFICANT CHANGE UP (ref 8.4–10.5)
CHLORIDE SERPL-SCNC: 96 MMOL/L — LOW (ref 98–110)
CO2 SERPL-SCNC: 27 MMOL/L — SIGNIFICANT CHANGE UP (ref 17–32)
CREAT SERPL-MCNC: 0.9 MG/DL — SIGNIFICANT CHANGE UP (ref 0.7–1.5)
EGFR: 89 ML/MIN/1.73M2 — SIGNIFICANT CHANGE UP
EGFR: 89 ML/MIN/1.73M2 — SIGNIFICANT CHANGE UP
EOSINOPHIL # BLD AUTO: 0.07 K/UL — SIGNIFICANT CHANGE UP (ref 0–0.5)
EOSINOPHIL NFR BLD AUTO: 0.5 % — SIGNIFICANT CHANGE UP (ref 0–6)
GAS PNL BLDV: 130 MMOL/L — LOW (ref 136–145)
GAS PNL BLDV: SIGNIFICANT CHANGE UP
GAS PNL BLDV: SIGNIFICANT CHANGE UP
GLUCOSE SERPL-MCNC: 132 MG/DL — HIGH (ref 70–99)
HCO3 BLDV-SCNC: 33 MMOL/L — HIGH (ref 22–29)
HCT VFR BLD CALC: 49.2 % — SIGNIFICANT CHANGE UP (ref 39–50)
HCT VFR BLDA CALC: 58 % — CRITICAL HIGH (ref 39–51)
HGB BLD-MCNC: 16.1 G/DL — SIGNIFICANT CHANGE UP (ref 13–17)
IMM GRANULOCYTES # BLD AUTO: 0.04 K/UL — SIGNIFICANT CHANGE UP (ref 0–0.07)
IMM GRANULOCYTES NFR BLD AUTO: 0.3 % — SIGNIFICANT CHANGE UP (ref 0–0.9)
INR BLD: 1.14 RATIO — SIGNIFICANT CHANGE UP (ref 0.65–1.3)
LACTATE BLDV-MCNC: 1.3 MMOL/L — SIGNIFICANT CHANGE UP (ref 0.5–2)
LIDOCAIN IGE QN: 15 U/L — SIGNIFICANT CHANGE UP (ref 7–60)
LYMPHOCYTES # BLD AUTO: 1.46 K/UL — SIGNIFICANT CHANGE UP (ref 1–3.3)
LYMPHOCYTES NFR BLD AUTO: 10.9 % — LOW (ref 13–44)
MAGNESIUM SERPL-MCNC: 1.5 MG/DL — LOW (ref 1.8–2.4)
MCHC RBC-ENTMCNC: 29.9 PG — SIGNIFICANT CHANGE UP (ref 27–34)
MCHC RBC-ENTMCNC: 32.7 G/DL — SIGNIFICANT CHANGE UP (ref 32–36)
MCV RBC AUTO: 91.4 FL — SIGNIFICANT CHANGE UP (ref 80–100)
MONOCYTES # BLD AUTO: 1.27 K/UL — HIGH (ref 0–0.9)
MONOCYTES NFR BLD AUTO: 9.5 % — SIGNIFICANT CHANGE UP (ref 2–14)
NEUTROPHILS # BLD AUTO: 10.44 K/UL — HIGH (ref 1.8–7.4)
NEUTROPHILS NFR BLD AUTO: 78.3 % — HIGH (ref 43–77)
NRBC # BLD AUTO: 0 K/UL — SIGNIFICANT CHANGE UP (ref 0–0)
NRBC # FLD: 0 K/UL — SIGNIFICANT CHANGE UP (ref 0–0)
NRBC BLD AUTO-RTO: 0 /100 WBCS — SIGNIFICANT CHANGE UP (ref 0–0)
NT-PROBNP SERPL-SCNC: 662 PG/ML — HIGH (ref 0–300)
PCO2 BLDV: 52 MMHG — SIGNIFICANT CHANGE UP (ref 42–55)
PH BLDV: 7.41 — SIGNIFICANT CHANGE UP (ref 7.32–7.43)
PLATELET # BLD AUTO: 205 K/UL — SIGNIFICANT CHANGE UP (ref 150–400)
PMV BLD: 9.3 FL — SIGNIFICANT CHANGE UP (ref 7–13)
PO2 BLDV: 43 MMHG — SIGNIFICANT CHANGE UP (ref 25–45)
POTASSIUM BLDV-SCNC: 3.7 MMOL/L — SIGNIFICANT CHANGE UP (ref 3.5–5.1)
POTASSIUM SERPL-MCNC: 4 MMOL/L — SIGNIFICANT CHANGE UP (ref 3.5–5)
POTASSIUM SERPL-SCNC: 4 MMOL/L — SIGNIFICANT CHANGE UP (ref 3.5–5)
PROT SERPL-MCNC: 6.3 G/DL — SIGNIFICANT CHANGE UP (ref 6–8)
PROTHROM AB SERPL-ACNC: 13.5 SEC — HIGH (ref 9.95–12.87)
RBC # BLD: 5.38 M/UL — SIGNIFICANT CHANGE UP (ref 4.2–5.8)
RBC # FLD: 14.7 % — HIGH (ref 10.3–14.5)
SODIUM SERPL-SCNC: 136 MMOL/L — SIGNIFICANT CHANGE UP (ref 135–146)
TROPONIN T, HIGH SENSITIVITY RESULT: 12 NG/L — SIGNIFICANT CHANGE UP (ref 6–21)
TROPONIN T, HIGH SENSITIVITY RESULT: 13 NG/L — SIGNIFICANT CHANGE UP (ref 6–21)
WBC # BLD: 13.35 K/UL — HIGH (ref 3.8–10.5)
WBC # FLD AUTO: 13.35 K/UL — HIGH (ref 3.8–10.5)

## 2025-08-31 PROCEDURE — 85025 COMPLETE CBC W/AUTO DIFF WBC: CPT

## 2025-08-31 PROCEDURE — 83735 ASSAY OF MAGNESIUM: CPT

## 2025-08-31 PROCEDURE — 96374 THER/PROPH/DIAG INJ IV PUSH: CPT | Mod: XU

## 2025-08-31 PROCEDURE — 99285 EMERGENCY DEPT VISIT HI MDM: CPT | Mod: 25

## 2025-08-31 PROCEDURE — 84295 ASSAY OF SERUM SODIUM: CPT

## 2025-08-31 PROCEDURE — 93010 ELECTROCARDIOGRAM REPORT: CPT

## 2025-08-31 PROCEDURE — 82330 ASSAY OF CALCIUM: CPT

## 2025-08-31 PROCEDURE — 83690 ASSAY OF LIPASE: CPT

## 2025-08-31 PROCEDURE — 71045 X-RAY EXAM CHEST 1 VIEW: CPT | Mod: 26

## 2025-08-31 PROCEDURE — 71045 X-RAY EXAM CHEST 1 VIEW: CPT

## 2025-08-31 PROCEDURE — 84484 ASSAY OF TROPONIN QUANT: CPT

## 2025-08-31 PROCEDURE — 71275 CT ANGIOGRAPHY CHEST: CPT | Mod: 26

## 2025-08-31 PROCEDURE — 85014 HEMATOCRIT: CPT

## 2025-08-31 PROCEDURE — 83880 ASSAY OF NATRIURETIC PEPTIDE: CPT

## 2025-08-31 PROCEDURE — 99285 EMERGENCY DEPT VISIT HI MDM: CPT

## 2025-08-31 PROCEDURE — 71275 CT ANGIOGRAPHY CHEST: CPT

## 2025-08-31 PROCEDURE — 84132 ASSAY OF SERUM POTASSIUM: CPT

## 2025-08-31 PROCEDURE — 93005 ELECTROCARDIOGRAM TRACING: CPT

## 2025-08-31 PROCEDURE — 85018 HEMOGLOBIN: CPT

## 2025-08-31 PROCEDURE — 85610 PROTHROMBIN TIME: CPT

## 2025-08-31 PROCEDURE — 85730 THROMBOPLASTIN TIME PARTIAL: CPT

## 2025-08-31 PROCEDURE — 36415 COLL VENOUS BLD VENIPUNCTURE: CPT

## 2025-08-31 PROCEDURE — 80053 COMPREHEN METABOLIC PANEL: CPT

## 2025-08-31 PROCEDURE — 83605 ASSAY OF LACTIC ACID: CPT

## 2025-08-31 RX ORDER — ALBUTEROL SULFATE 2.5 MG/3ML
2 VIAL, NEBULIZER (ML) INHALATION
Qty: 1 | Refills: 0
Start: 2025-08-31 | End: 2025-09-06

## 2025-08-31 RX ORDER — MAGNESIUM SULFATE 500 MG/ML
2 SYRINGE (ML) INJECTION ONCE
Refills: 0 | Status: COMPLETED | OUTPATIENT
Start: 2025-08-31 | End: 2025-08-31

## 2025-08-31 RX ORDER — PREDNISONE 20 MG/1
1 TABLET ORAL
Qty: 5 | Refills: 0
Start: 2025-08-31 | End: 2025-09-04

## 2025-08-31 RX ADMIN — Medication 1000 MILLILITER(S): at 12:29

## 2025-08-31 RX ADMIN — Medication 25 GRAM(S): at 13:26
